# Patient Record
Sex: FEMALE | Race: BLACK OR AFRICAN AMERICAN | NOT HISPANIC OR LATINO | URBAN - METROPOLITAN AREA
[De-identification: names, ages, dates, MRNs, and addresses within clinical notes are randomized per-mention and may not be internally consistent; named-entity substitution may affect disease eponyms.]

---

## 2018-06-07 ENCOUNTER — OUTPATIENT (OUTPATIENT)
Dept: OUTPATIENT SERVICES | Facility: HOSPITAL | Age: 48
LOS: 1 days | End: 2018-06-07
Payer: MEDICAID

## 2018-06-07 ENCOUNTER — APPOINTMENT (OUTPATIENT)
Dept: MAMMOGRAPHY | Facility: IMAGING CENTER | Age: 48
End: 2018-06-07
Payer: COMMERCIAL

## 2018-06-07 ENCOUNTER — APPOINTMENT (OUTPATIENT)
Dept: ULTRASOUND IMAGING | Facility: IMAGING CENTER | Age: 48
End: 2018-06-07
Payer: COMMERCIAL

## 2018-06-07 DIAGNOSIS — Z00.00 ENCOUNTER FOR GENERAL ADULT MEDICAL EXAMINATION WITHOUT ABNORMAL FINDINGS: ICD-10-CM

## 2018-06-07 PROCEDURE — 77066 DX MAMMO INCL CAD BI: CPT

## 2018-06-07 PROCEDURE — 77066 DX MAMMO INCL CAD BI: CPT | Mod: 26

## 2018-06-07 PROCEDURE — G0279: CPT | Mod: 26

## 2018-06-07 PROCEDURE — 76641 ULTRASOUND BREAST COMPLETE: CPT

## 2018-06-07 PROCEDURE — G0279: CPT

## 2018-06-08 PROCEDURE — 76641 ULTRASOUND BREAST COMPLETE: CPT | Mod: 26,50

## 2018-10-04 ENCOUNTER — APPOINTMENT (OUTPATIENT)
Dept: SURGICAL ONCOLOGY | Facility: CLINIC | Age: 48
End: 2018-10-04
Payer: MEDICAID

## 2018-10-04 ENCOUNTER — RECORD ABSTRACTING (OUTPATIENT)
Age: 48
End: 2018-10-04

## 2018-10-04 VITALS
BODY MASS INDEX: 41.62 KG/M2 | OXYGEN SATURATION: 98 % | DIASTOLIC BLOOD PRESSURE: 93 MMHG | HEART RATE: 87 BPM | SYSTOLIC BLOOD PRESSURE: 142 MMHG | WEIGHT: 259 LBS | HEIGHT: 66 IN

## 2018-10-04 PROCEDURE — 99203 OFFICE O/P NEW LOW 30 MIN: CPT

## 2019-01-19 ENCOUNTER — EMERGENCY (EMERGENCY)
Facility: HOSPITAL | Age: 49
LOS: 0 days | Discharge: HOME | End: 2019-01-19
Attending: EMERGENCY MEDICINE
Payer: COMMERCIAL

## 2019-01-19 VITALS
HEIGHT: 66 IN | HEART RATE: 81 BPM | TEMPERATURE: 98 F | RESPIRATION RATE: 21 BRPM | OXYGEN SATURATION: 100 % | DIASTOLIC BLOOD PRESSURE: 86 MMHG | SYSTOLIC BLOOD PRESSURE: 158 MMHG | WEIGHT: 259.04 LBS

## 2019-01-19 VITALS
TEMPERATURE: 99 F | HEART RATE: 80 BPM | RESPIRATION RATE: 18 BRPM | OXYGEN SATURATION: 98 % | DIASTOLIC BLOOD PRESSURE: 88 MMHG | SYSTOLIC BLOOD PRESSURE: 144 MMHG

## 2019-01-19 DIAGNOSIS — K29.70 GASTRITIS, UNSPECIFIED, WITHOUT BLEEDING: ICD-10-CM

## 2019-01-19 DIAGNOSIS — R10.9 UNSPECIFIED ABDOMINAL PAIN: ICD-10-CM

## 2019-01-19 LAB
ALBUMIN SERPL ELPH-MCNC: 3.1 G/DL — LOW (ref 3.3–5)
ALP SERPL-CCNC: 113 U/L — SIGNIFICANT CHANGE UP (ref 40–120)
ALT FLD-CCNC: 47 U/L — SIGNIFICANT CHANGE UP (ref 12–78)
AMYLASE P1 CFR SERPL: 59 U/L — SIGNIFICANT CHANGE UP (ref 25–115)
ANION GAP SERPL CALC-SCNC: 7 MMOL/L — SIGNIFICANT CHANGE UP (ref 5–17)
AST SERPL-CCNC: 55 U/L — HIGH (ref 15–37)
BASOPHILS # BLD AUTO: 0.02 K/UL — SIGNIFICANT CHANGE UP (ref 0–0.2)
BASOPHILS NFR BLD AUTO: 0.3 % — SIGNIFICANT CHANGE UP (ref 0–2)
BILIRUB SERPL-MCNC: 0.2 MG/DL — SIGNIFICANT CHANGE UP (ref 0.2–1.2)
BUN SERPL-MCNC: 12 MG/DL — SIGNIFICANT CHANGE UP (ref 7–23)
CALCIUM SERPL-MCNC: 8.6 MG/DL — SIGNIFICANT CHANGE UP (ref 8.5–10.1)
CHLORIDE SERPL-SCNC: 107 MMOL/L — SIGNIFICANT CHANGE UP (ref 96–108)
CO2 SERPL-SCNC: 28 MMOL/L — SIGNIFICANT CHANGE UP (ref 22–31)
CREAT SERPL-MCNC: 0.77 MG/DL — SIGNIFICANT CHANGE UP (ref 0.5–1.3)
EOSINOPHIL # BLD AUTO: 0.22 K/UL — SIGNIFICANT CHANGE UP (ref 0–0.5)
EOSINOPHIL NFR BLD AUTO: 2.9 % — SIGNIFICANT CHANGE UP (ref 0–6)
GLUCOSE SERPL-MCNC: 121 MG/DL — HIGH (ref 70–99)
HCG SERPL-ACNC: 1 MIU/ML — SIGNIFICANT CHANGE UP
HCT VFR BLD CALC: 36 % — SIGNIFICANT CHANGE UP (ref 34.5–45)
HGB BLD-MCNC: 11.6 G/DL — SIGNIFICANT CHANGE UP (ref 11.5–15.5)
IMM GRANULOCYTES NFR BLD AUTO: 0.1 % — SIGNIFICANT CHANGE UP (ref 0–1.5)
LIDOCAIN IGE QN: 160 U/L — SIGNIFICANT CHANGE UP (ref 73–393)
LYMPHOCYTES # BLD AUTO: 2.51 K/UL — SIGNIFICANT CHANGE UP (ref 1–3.3)
LYMPHOCYTES # BLD AUTO: 32.9 % — SIGNIFICANT CHANGE UP (ref 13–44)
MCHC RBC-ENTMCNC: 27.6 PG — SIGNIFICANT CHANGE UP (ref 27–34)
MCHC RBC-ENTMCNC: 32.2 GM/DL — SIGNIFICANT CHANGE UP (ref 32–36)
MCV RBC AUTO: 85.5 FL — SIGNIFICANT CHANGE UP (ref 80–100)
MONOCYTES # BLD AUTO: 0.34 K/UL — SIGNIFICANT CHANGE UP (ref 0–0.9)
MONOCYTES NFR BLD AUTO: 4.5 % — SIGNIFICANT CHANGE UP (ref 2–14)
NEUTROPHILS # BLD AUTO: 4.53 K/UL — SIGNIFICANT CHANGE UP (ref 1.8–7.4)
NEUTROPHILS NFR BLD AUTO: 59.3 % — SIGNIFICANT CHANGE UP (ref 43–77)
NRBC # BLD: 0 /100 WBCS — SIGNIFICANT CHANGE UP (ref 0–0)
PLATELET # BLD AUTO: 139 K/UL — LOW (ref 150–400)
POTASSIUM SERPL-MCNC: 3.8 MMOL/L — SIGNIFICANT CHANGE UP (ref 3.5–5.3)
POTASSIUM SERPL-SCNC: 3.8 MMOL/L — SIGNIFICANT CHANGE UP (ref 3.5–5.3)
PROT SERPL-MCNC: 7.2 GM/DL — SIGNIFICANT CHANGE UP (ref 6–8.3)
RBC # BLD: 4.21 M/UL — SIGNIFICANT CHANGE UP (ref 3.8–5.2)
RBC # FLD: 14.6 % — HIGH (ref 10.3–14.5)
SODIUM SERPL-SCNC: 142 MMOL/L — SIGNIFICANT CHANGE UP (ref 135–145)
TROPONIN I SERPL-MCNC: <.015 NG/ML — SIGNIFICANT CHANGE UP (ref 0.01–0.04)
TROPONIN I SERPL-MCNC: <.015 NG/ML — SIGNIFICANT CHANGE UP (ref 0.01–0.04)
WBC # BLD: 7.63 K/UL — SIGNIFICANT CHANGE UP (ref 3.8–10.5)
WBC # FLD AUTO: 7.63 K/UL — SIGNIFICANT CHANGE UP (ref 3.8–10.5)

## 2019-01-19 PROCEDURE — 99285 EMERGENCY DEPT VISIT HI MDM: CPT

## 2019-01-19 PROCEDURE — 71045 X-RAY EXAM CHEST 1 VIEW: CPT | Mod: 26

## 2019-01-19 PROCEDURE — 74177 CT ABD & PELVIS W/CONTRAST: CPT | Mod: 26

## 2019-01-19 RX ORDER — SODIUM CHLORIDE 9 MG/ML
1000 INJECTION INTRAMUSCULAR; INTRAVENOUS; SUBCUTANEOUS ONCE
Qty: 0 | Refills: 0 | Status: COMPLETED | OUTPATIENT
Start: 2019-01-19 | End: 2019-01-19

## 2019-01-19 RX ORDER — PANTOPRAZOLE SODIUM 20 MG/1
40 TABLET, DELAYED RELEASE ORAL ONCE
Qty: 0 | Refills: 0 | Status: COMPLETED | OUTPATIENT
Start: 2019-01-19 | End: 2019-01-19

## 2019-01-19 RX ORDER — FAMOTIDINE 10 MG/ML
20 INJECTION INTRAVENOUS ONCE
Qty: 0 | Refills: 0 | Status: COMPLETED | OUTPATIENT
Start: 2019-01-19 | End: 2019-01-19

## 2019-01-19 RX ORDER — OMEPRAZOLE 10 MG/1
1 CAPSULE, DELAYED RELEASE ORAL
Qty: 30 | Refills: 0 | OUTPATIENT
Start: 2019-01-19 | End: 2019-02-17

## 2019-01-19 RX ADMIN — FAMOTIDINE 20 MILLIGRAM(S): 10 INJECTION INTRAVENOUS at 01:32

## 2019-01-19 RX ADMIN — SODIUM CHLORIDE 1000 MILLILITER(S): 9 INJECTION INTRAMUSCULAR; INTRAVENOUS; SUBCUTANEOUS at 01:32

## 2019-01-19 RX ADMIN — PANTOPRAZOLE SODIUM 40 MILLIGRAM(S): 20 TABLET, DELAYED RELEASE ORAL at 04:19

## 2019-01-19 RX ADMIN — Medication 30 MILLILITER(S): at 01:32

## 2019-01-19 RX ADMIN — SODIUM CHLORIDE 1000 MILLILITER(S): 9 INJECTION INTRAMUSCULAR; INTRAVENOUS; SUBCUTANEOUS at 04:19

## 2019-01-19 NOTE — ED PROVIDER NOTE - OBJECTIVE STATEMENT
Pertinent PMH/PSH/FHx/SHx and Review of Systems contained within:  Patient presents to the ED for abdominal pain.  Patient reports epigastric pain which started about 1 hour ago, with slight radiating towards umbilicus.  Denies radiation to the back, RUQ, chest.  Denies any shortness of breath, calf pain, leg swelling, or use of OCPs.  No associated fever, nausea, vomiting, or diarrhea.  Denies excessive caffeine intake or use of NSAIDs. LBM was earlier today and normal. Denies associated vaginal discharge, bleeding, or urinary symptoms.     Relevant PMHx/SHx/SOCHx/FAMH:  History of Csection  Patient denies EtOH/tobacco/illicit substance use.    ROS: No fever/chills, No headache/photophobia/eye pain/changes in vision, No ear pain/sore throat/dysphagia, No chest pain/palpitations, no SOB/cough/wheeze/stridor, No N/V/D/melena, no dysuria/frequency/discharge, No neck/back pain, no rash, no changes in neurological status/function.

## 2019-01-19 NOTE — ED ADULT NURSE NOTE - NSIMPLEMENTINTERV_GEN_ALL_ED
Implemented All Universal Safety Interventions:  [place] to call system. Call bell, personal items and telephone within reach. Instruct patient to call for assistance. Room bathroom lighting operational. Non-slip footwear when patient is off stretcher. Physically safe environment: no spills, clutter or unnecessary equipment. Stretcher in lowest position, wheels locked, appropriate side rails in place.

## 2019-01-19 NOTE — ED ADULT TRIAGE NOTE - CHIEF COMPLAINT QUOTE
Patient reports : "A terrible pain in my stomach." Pain started 1 hour ago. + nausea. denies vomiting or diarrhea. Patient reports : "A terrible pain in my stomach." Pain started 1 hour ago. + nausea. denies vomiting or diarrhea. patient with epigastric area pain.

## 2019-01-19 NOTE — ED PROVIDER NOTE - MEDICAL DECISION MAKING DETAILS
Patient presents for epigastric pain.  VSS.  Serial EKG and troponins negative.  Pain is much improved with PPIs.  Remaining lab values reviewed, there are no values which require acute intervention.  Will treat for gastritis, GI and cardiology referrals.  Discussed results and outcome of today's visit with the patient.  Patient advised to please follow up with another healthcare provider within the next 24 hours and return to the Emergency Department for worsening symptoms or any other concerns.  Patient advised that their doctor may call  to follow up on the specific results of the tests performed today in the emergency department.   Patient appears well on discharge.

## 2019-01-19 NOTE — ED ADULT NURSE NOTE - CHIEF COMPLAINT QUOTE
Patient reports : "A terrible pain in my stomach." Pain started 1 hour ago. + nausea. denies vomiting or diarrhea. patient with epigastric area pain.

## 2019-01-19 NOTE — ED PROVIDER NOTE - PHYSICAL EXAMINATION
Gen: Alert, mild distress, well appearing  Head: NC, AT, PERRL, EOMI, normal lids/conjunctiva  ENT: normal hearing, patent oropharynx without erythema/exudate, uvula midline  Neck: +supple, no tenderness/meningismus/JVD, +Trachea midline  Pulm: Bilateral BS, normal resp effort, no wheeze/stridor/retractions  CV: RRR, no M/R/G, +dist pulses  Abd: soft, +epigastric tenderness to palpation, ND, +BS, no palpable masses  Mskel: no edema/erythema/cyanosis  Skin: no rash, warm/dry  Neuro: AAOx3, no apparent sensory/motor deficits, coordination intact

## 2019-01-24 ENCOUNTER — EMERGENCY (EMERGENCY)
Facility: HOSPITAL | Age: 49
LOS: 0 days | Discharge: HOME | End: 2019-01-24
Attending: EMERGENCY MEDICINE
Payer: COMMERCIAL

## 2019-01-24 VITALS
OXYGEN SATURATION: 98 % | HEART RATE: 81 BPM | RESPIRATION RATE: 20 BRPM | TEMPERATURE: 99 F | DIASTOLIC BLOOD PRESSURE: 89 MMHG | SYSTOLIC BLOOD PRESSURE: 139 MMHG

## 2019-01-24 VITALS
RESPIRATION RATE: 20 BRPM | SYSTOLIC BLOOD PRESSURE: 133 MMHG | WEIGHT: 257.06 LBS | HEIGHT: 66 IN | DIASTOLIC BLOOD PRESSURE: 94 MMHG | TEMPERATURE: 99 F | OXYGEN SATURATION: 99 % | HEART RATE: 104 BPM

## 2019-01-24 PROCEDURE — 99284 EMERGENCY DEPT VISIT MOD MDM: CPT

## 2019-01-24 RX ORDER — FAMOTIDINE 10 MG/ML
1 INJECTION INTRAVENOUS
Qty: 5 | Refills: 0 | OUTPATIENT
Start: 2019-01-24 | End: 2019-01-28

## 2019-01-24 RX ORDER — SODIUM CHLORIDE 9 MG/ML
1000 INJECTION INTRAMUSCULAR; INTRAVENOUS; SUBCUTANEOUS
Qty: 0 | Refills: 0 | Status: COMPLETED | OUTPATIENT
Start: 2019-01-24 | End: 2019-01-24

## 2019-01-24 RX ORDER — EPINEPHRINE 0.3 MG/.3ML
1 INJECTION INTRAMUSCULAR; SUBCUTANEOUS
Qty: 2 | Refills: 0 | OUTPATIENT
Start: 2019-01-24 | End: 2019-01-24

## 2019-01-24 RX ORDER — DIPHENHYDRAMINE HCL 50 MG
50 CAPSULE ORAL ONCE
Qty: 0 | Refills: 0 | Status: COMPLETED | OUTPATIENT
Start: 2019-01-24 | End: 2019-01-24

## 2019-01-24 RX ORDER — DIPHENHYDRAMINE HCL 50 MG
1 CAPSULE ORAL
Qty: 20 | Refills: 0 | OUTPATIENT
Start: 2019-01-24

## 2019-01-24 RX ORDER — FAMOTIDINE 10 MG/ML
20 INJECTION INTRAVENOUS ONCE
Qty: 0 | Refills: 0 | Status: COMPLETED | OUTPATIENT
Start: 2019-01-24 | End: 2019-01-24

## 2019-01-24 RX ADMIN — SODIUM CHLORIDE 2000 MILLILITER(S): 9 INJECTION INTRAMUSCULAR; INTRAVENOUS; SUBCUTANEOUS at 21:49

## 2019-01-24 RX ADMIN — FAMOTIDINE 20 MILLIGRAM(S): 10 INJECTION INTRAVENOUS at 18:51

## 2019-01-24 RX ADMIN — Medication 125 MILLIGRAM(S): at 18:51

## 2019-01-24 RX ADMIN — SODIUM CHLORIDE 1000 MILLILITER(S): 9 INJECTION INTRAMUSCULAR; INTRAVENOUS; SUBCUTANEOUS at 21:15

## 2019-01-24 RX ADMIN — SODIUM CHLORIDE 2000 MILLILITER(S): 9 INJECTION INTRAMUSCULAR; INTRAVENOUS; SUBCUTANEOUS at 20:45

## 2019-01-24 RX ADMIN — Medication 50 MILLIGRAM(S): at 18:51

## 2019-01-24 NOTE — ED ADULT NURSE NOTE - OBJECTIVE STATEMENT
Pt c/o swelling to face to redness and itching all over body this morning. pt denies difficulty swallowing, no apparent respiratory distress noted upon arrival to ED

## 2019-01-24 NOTE — ED ADULT TRIAGE NOTE - CHIEF COMPLAINT QUOTE
patient c/o of swelling face and lips started this morning , patient denied difficulty breathing , denied difficulty swallowing , c/o of rash all over the body , patient took Benadryl around 11 am

## 2019-01-24 NOTE — ED PROVIDER NOTE - CARE PROVIDER_API CALL
Rufino Amaya), Allergy and Immunology; Pediatrics  [address] [address]  Phone: [phone]  Fax: [phone]

## 2019-01-24 NOTE — ED PROVIDER NOTE - MEDICAL DECISION MAKING DETAILS
patient improved, hives resolved, no respiratory distress, desires discharge. educated on not being exposed to allergens and to have epi pen at all times in case of anaphylaxis. given referral to allergist. agrees to follow up and understands education given.

## 2019-01-24 NOTE — ED PROVIDER NOTE - OBJECTIVE STATEMENT
48 years old female walked in c/o itching rash to face, neck, back chest and arms since this morning. Pt sts she ate salmon last night. Pt denies headache, dizziness, difficulty of swallowing, sob, cough, chest pain, nausea, vomiting, fever, chills, abd pain, Pt sts she is not at risk of being pregnant.

## 2019-01-24 NOTE — ED PROVIDER NOTE - CONSTITUTIONAL, MLM
normal... Well appearing, well nourished, awake, alert, oriented to person, place, time/situation and in no apparent distress. Speaking in clear full sentences no drooling no nasal flaring no shoulders retractions, smiling appears very comfortable

## 2019-01-24 NOTE — ED PROVIDER NOTE - NSFOLLOWUPINSTRUCTIONS_ED_ALL_ED_FT
You are advised to please follow up with your primary care doctor within the next 24 hours and return to the Emergency Department for worsening symptoms or any other concerns.  Your doctor may call  to follow up on the specific results of the tests performed today in the emergency department.

## 2019-01-26 DIAGNOSIS — R21 RASH AND OTHER NONSPECIFIC SKIN ERUPTION: ICD-10-CM

## 2019-01-26 DIAGNOSIS — T78.40XA ALLERGY, UNSPECIFIED, INITIAL ENCOUNTER: ICD-10-CM

## 2019-01-26 DIAGNOSIS — T78.49XA OTHER ALLERGY, INITIAL ENCOUNTER: ICD-10-CM
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
## 2019-01-30 ENCOUNTER — APPOINTMENT (OUTPATIENT)
Dept: MAMMOGRAPHY | Facility: IMAGING CENTER | Age: 49
End: 2019-01-30
Payer: MEDICAID

## 2019-01-30 ENCOUNTER — APPOINTMENT (OUTPATIENT)
Dept: ULTRASOUND IMAGING | Facility: IMAGING CENTER | Age: 49
End: 2019-01-30
Payer: MEDICAID

## 2019-01-30 ENCOUNTER — OUTPATIENT (OUTPATIENT)
Dept: OUTPATIENT SERVICES | Facility: HOSPITAL | Age: 49
LOS: 1 days | End: 2019-01-30
Payer: MEDICAID

## 2019-01-30 DIAGNOSIS — Z00.8 ENCOUNTER FOR OTHER GENERAL EXAMINATION: ICD-10-CM

## 2019-01-30 PROCEDURE — 77066 DX MAMMO INCL CAD BI: CPT

## 2019-01-30 PROCEDURE — 76642 ULTRASOUND BREAST LIMITED: CPT | Mod: 26,50

## 2019-01-30 PROCEDURE — G0279: CPT | Mod: 26

## 2019-01-30 PROCEDURE — G0279: CPT

## 2019-01-30 PROCEDURE — 76642 ULTRASOUND BREAST LIMITED: CPT

## 2019-01-30 PROCEDURE — 77066 DX MAMMO INCL CAD BI: CPT | Mod: 26

## 2019-07-15 ENCOUNTER — OUTPATIENT (OUTPATIENT)
Dept: OUTPATIENT SERVICES | Facility: HOSPITAL | Age: 49
LOS: 1 days | End: 2019-07-15
Payer: COMMERCIAL

## 2019-07-15 ENCOUNTER — APPOINTMENT (OUTPATIENT)
Dept: ULTRASOUND IMAGING | Facility: IMAGING CENTER | Age: 49
End: 2019-07-15
Payer: COMMERCIAL

## 2019-07-15 ENCOUNTER — APPOINTMENT (OUTPATIENT)
Dept: MAMMOGRAPHY | Facility: IMAGING CENTER | Age: 49
End: 2019-07-15
Payer: COMMERCIAL

## 2019-07-15 DIAGNOSIS — Z00.8 ENCOUNTER FOR OTHER GENERAL EXAMINATION: ICD-10-CM

## 2019-07-15 PROCEDURE — 76642 ULTRASOUND BREAST LIMITED: CPT

## 2019-07-15 PROCEDURE — 77065 DX MAMMO INCL CAD UNI: CPT

## 2019-07-15 PROCEDURE — 76642 ULTRASOUND BREAST LIMITED: CPT | Mod: 26,LT

## 2019-07-15 PROCEDURE — G0279: CPT | Mod: 26

## 2019-07-15 PROCEDURE — 77065 DX MAMMO INCL CAD UNI: CPT | Mod: 26,LT

## 2019-07-15 PROCEDURE — G0279: CPT

## 2019-07-20 ENCOUNTER — OUTPATIENT (OUTPATIENT)
Dept: OUTPATIENT SERVICES | Facility: HOSPITAL | Age: 49
LOS: 1 days | End: 2019-07-20
Payer: COMMERCIAL

## 2019-07-20 ENCOUNTER — APPOINTMENT (OUTPATIENT)
Dept: ULTRASOUND IMAGING | Facility: IMAGING CENTER | Age: 49
End: 2019-07-20
Payer: COMMERCIAL

## 2019-07-20 DIAGNOSIS — Z00.8 ENCOUNTER FOR OTHER GENERAL EXAMINATION: ICD-10-CM

## 2019-07-20 PROCEDURE — 76642 ULTRASOUND BREAST LIMITED: CPT

## 2019-07-20 PROCEDURE — 76642 ULTRASOUND BREAST LIMITED: CPT | Mod: 26,50

## 2019-08-03 ENCOUNTER — RESULT REVIEW (OUTPATIENT)
Age: 49
End: 2019-08-03

## 2019-09-14 ENCOUNTER — RESULT REVIEW (OUTPATIENT)
Age: 49
End: 2019-09-14

## 2019-12-15 NOTE — ED ADULT NURSE NOTE - NSFALLRSKASSESASSIST_ED_ALL_ED
MD notified of elevated BP.  Awaiting new medication orders.  Will continue to closely monitor.     no

## 2020-04-26 ENCOUNTER — MESSAGE (OUTPATIENT)
Age: 50
End: 2020-04-26

## 2020-05-06 ENCOUNTER — APPOINTMENT (OUTPATIENT)
Dept: DISASTER EMERGENCY | Facility: CLINIC | Age: 50
End: 2020-05-06

## 2020-05-07 LAB
SARS-COV-2 IGG SERPL IA-ACNC: 0.2 INDEX
SARS-COV-2 IGG SERPL QL IA: NEGATIVE

## 2020-05-19 ENCOUNTER — APPOINTMENT (OUTPATIENT)
Dept: OTOLARYNGOLOGY | Facility: CLINIC | Age: 50
End: 2020-05-19

## 2020-05-29 ENCOUNTER — OUTPATIENT (OUTPATIENT)
Dept: OUTPATIENT SERVICES | Facility: HOSPITAL | Age: 50
LOS: 1 days | End: 2020-05-29
Payer: COMMERCIAL

## 2020-05-29 DIAGNOSIS — Z00.8 ENCOUNTER FOR OTHER GENERAL EXAMINATION: ICD-10-CM

## 2020-05-29 PROCEDURE — 77063 BREAST TOMOSYNTHESIS BI: CPT

## 2020-05-29 PROCEDURE — 76641 ULTRASOUND BREAST COMPLETE: CPT | Mod: 26,50

## 2020-05-29 PROCEDURE — 77067 SCR MAMMO BI INCL CAD: CPT | Mod: 26

## 2020-05-29 PROCEDURE — 77067 SCR MAMMO BI INCL CAD: CPT

## 2020-05-29 PROCEDURE — 76641 ULTRASOUND BREAST COMPLETE: CPT

## 2020-05-29 PROCEDURE — 77063 BREAST TOMOSYNTHESIS BI: CPT | Mod: 26

## 2020-08-04 ENCOUNTER — APPOINTMENT (OUTPATIENT)
Dept: GASTROENTEROLOGY | Facility: CLINIC | Age: 50
End: 2020-08-04

## 2020-11-05 ENCOUNTER — APPOINTMENT (OUTPATIENT)
Dept: GASTROENTEROLOGY | Facility: CLINIC | Age: 50
End: 2020-11-05
Payer: COMMERCIAL

## 2020-11-05 VITALS
WEIGHT: 262.9 LBS | DIASTOLIC BLOOD PRESSURE: 84 MMHG | TEMPERATURE: 97.9 F | BODY MASS INDEX: 42.25 KG/M2 | HEIGHT: 66 IN | SYSTOLIC BLOOD PRESSURE: 130 MMHG | HEART RATE: 98 BPM

## 2020-11-05 DIAGNOSIS — G43.909 MIGRAINE, UNSPECIFIED, NOT INTRACTABLE, W/OUT STATUS MIGRAINOSUS: ICD-10-CM

## 2020-11-05 DIAGNOSIS — Z12.11 ENCOUNTER FOR SCREENING FOR MALIGNANT NEOPLASM OF COLON: ICD-10-CM

## 2020-11-05 PROCEDURE — 99204 OFFICE O/P NEW MOD 45 MIN: CPT

## 2020-11-05 PROCEDURE — 99072 ADDL SUPL MATRL&STAF TM PHE: CPT

## 2020-11-05 NOTE — REVIEW OF SYSTEMS
[Recent Weight Loss (___ Lbs)] : recent [unfilled] ~Ulb weight loss [Heartburn] : heartburn [As Noted in HPI] : as noted in HPI [Negative] : Heme/Lymph

## 2020-11-05 NOTE — HISTORY OF PRESENT ILLNESS
[Heartburn] : stable heartburn [Nausea] : denies nausea [Vomiting] : denies vomiting [Diarrhea] : denies diarrhea [Constipation] : denies constipation [Yellow Skin Or Eyes (Jaundice)] : denies jaundice [Abdominal Pain] : denies abdominal pain [Abdominal Swelling] : denies abdominal swelling [Rectal Pain] : denies rectal pain [de-identified] : Kathrine presents to the office today for evaluation for colon cancer screening.  \par \par She reports heartburn when she eats greasy foods.  She otherwise feels well from a GI perspective, and denies any dysphagia, nausea, abdominal pain, change in bowel habits, weight loss, or family history of colon cancer.  She normally moves her bowels once a day, but can see scant rectal bleeding with constipation.  She has never had a colonoscopy before.

## 2020-11-05 NOTE — ASSESSMENT
[FreeTextEntry1] : 1.  Encounter for colon cancer screening in average risk patient.  No previous colonoscopy.\par 2.  Heartburn with fatty foods, stable with diet control.\par 3.  Obesity.\par \par Recs:\par - Recent labs reviewed.\par - Patient was counseled on GERD lifestyle modifications including head of bed elevation at night, avoiding lying down 2-3 hours after eating, weight loss, avoiding tight-fitting clothing, eating small, frequent meals, and minimizing potential food triggers (caffeine, spicy foods, citrus foods, chocolate, mint, alcohol).\par - Patient was advised to undergo colonoscopy- procedure, risks, benefits, and alternatives were explained. Patient agreeable. Brochure given. Suprep.

## 2020-11-05 NOTE — CONSULT LETTER
[Dear  ___] : Dear  [unfilled], [Consult Letter:] : I had the pleasure of evaluating your patient, [unfilled]. [Please see my note below.] : Please see my note below. [Consult Closing:] : Thank you very much for allowing me to participate in the care of this patient.  If you have any questions, please do not hesitate to contact me. [Sincerely,] : Sincerely, [FreeTextEntry3] : Oswaldo Mcfarlane MD\par Department of Gastroenterology\par [facility]\par [address] [place] [address], [facility] [address]\[place] [address]\par Tel: [phone]\par Email: [email or web address]

## 2020-12-04 ENCOUNTER — TRANSCRIPTION ENCOUNTER (OUTPATIENT)
Age: 50
End: 2020-12-04

## 2020-12-04 ENCOUNTER — APPOINTMENT (OUTPATIENT)
Dept: DISASTER EMERGENCY | Facility: CLINIC | Age: 50
End: 2020-12-04

## 2020-12-05 LAB — SARS-COV-2 N GENE NPH QL NAA+PROBE: NOT DETECTED

## 2020-12-07 ENCOUNTER — APPOINTMENT (OUTPATIENT)
Dept: GASTROENTEROLOGY | Facility: HOSPITAL | Age: 50
End: 2020-12-07

## 2020-12-07 ENCOUNTER — OUTPATIENT (OUTPATIENT)
Dept: OUTPATIENT SERVICES | Facility: HOSPITAL | Age: 50
LOS: 1 days | Discharge: HOME | End: 2020-12-07
Payer: COMMERCIAL

## 2020-12-07 VITALS
DIASTOLIC BLOOD PRESSURE: 81 MMHG | OXYGEN SATURATION: 99 % | SYSTOLIC BLOOD PRESSURE: 118 MMHG | HEART RATE: 68 BPM | RESPIRATION RATE: 19 BRPM

## 2020-12-07 VITALS
TEMPERATURE: 98 F | SYSTOLIC BLOOD PRESSURE: 119 MMHG | RESPIRATION RATE: 20 BRPM | WEIGHT: 261.91 LBS | DIASTOLIC BLOOD PRESSURE: 90 MMHG | OXYGEN SATURATION: 96 % | HEART RATE: 80 BPM | HEIGHT: 66 IN

## 2020-12-07 DIAGNOSIS — Z12.11 ENCOUNTER FOR SCREENING FOR MALIGNANT NEOPLASM OF COLON: ICD-10-CM

## 2020-12-07 PROCEDURE — 45378 DIAGNOSTIC COLONOSCOPY: CPT | Mod: 33

## 2020-12-23 PROBLEM — Z12.11 ENCOUNTER FOR SCREENING FOR MALIGNANT NEOPLASM OF COLON: Status: RESOLVED | Noted: 2020-11-05 | Resolved: 2020-12-23

## 2021-01-13 DIAGNOSIS — Z00.00 ENCOUNTER FOR GENERAL ADULT MEDICAL EXAMINATION W/OUT ABNORMAL FINDINGS: ICD-10-CM

## 2021-01-15 ENCOUNTER — APPOINTMENT (OUTPATIENT)
Dept: SURGERY | Facility: CLINIC | Age: 51
End: 2021-01-15
Payer: COMMERCIAL

## 2021-01-15 VITALS
DIASTOLIC BLOOD PRESSURE: 95 MMHG | SYSTOLIC BLOOD PRESSURE: 144 MMHG | RESPIRATION RATE: 16 BRPM | HEART RATE: 76 BPM | OXYGEN SATURATION: 98 %

## 2021-01-15 VITALS — WEIGHT: 256 LBS | BODY MASS INDEX: 42.65 KG/M2 | HEIGHT: 65 IN

## 2021-01-15 DIAGNOSIS — R73.03 PREDIABETES.: ICD-10-CM

## 2021-01-15 DIAGNOSIS — Z87.39 PERSONAL HISTORY OF OTHER DISEASES OF THE MUSCULOSKELETAL SYSTEM AND CONNECTIVE TISSUE: ICD-10-CM

## 2021-01-15 DIAGNOSIS — M25.562 PAIN IN RIGHT KNEE: ICD-10-CM

## 2021-01-15 DIAGNOSIS — M25.561 PAIN IN RIGHT KNEE: ICD-10-CM

## 2021-01-15 DIAGNOSIS — G89.29 PAIN IN RIGHT KNEE: ICD-10-CM

## 2021-01-15 LAB
APTT BLD: 30.3 SEC
BASOPHILS # BLD AUTO: 0.02 K/UL
BASOPHILS NFR BLD AUTO: 0.4 %
EOSINOPHIL # BLD AUTO: 0.17 K/UL
EOSINOPHIL NFR BLD AUTO: 3.4 %
HCT VFR BLD CALC: 39 %
HGB BLD-MCNC: 12.4 G/DL
IMM GRANULOCYTES NFR BLD AUTO: 0.2 %
INR PPP: 1.08 RATIO
LYMPHOCYTES # BLD AUTO: 2.66 K/UL
LYMPHOCYTES NFR BLD AUTO: 53.1 %
MAN DIFF?: NORMAL
MCHC RBC-ENTMCNC: 28.2 PG
MCHC RBC-ENTMCNC: 31.8 GM/DL
MCV RBC AUTO: 88.6 FL
MONOCYTES # BLD AUTO: 0.24 K/UL
MONOCYTES NFR BLD AUTO: 4.8 %
NEUTROPHILS # BLD AUTO: 1.91 K/UL
NEUTROPHILS NFR BLD AUTO: 38.1 %
PLATELET # BLD AUTO: 310 K/UL
PT BLD: 12.7 SEC
RBC # BLD: 4.4 M/UL
RBC # FLD: 13.6 %
WBC # FLD AUTO: 5.01 K/UL

## 2021-01-15 PROCEDURE — 99072 ADDL SUPL MATRL&STAF TM PHE: CPT

## 2021-01-15 PROCEDURE — 99205 OFFICE O/P NEW HI 60 MIN: CPT

## 2021-01-15 RX ORDER — SODIUM SULFATE, POTASSIUM SULFATE, MAGNESIUM SULFATE 17.5; 3.13; 1.6 G/ML; G/ML; G/ML
17.5-3.13-1.6 SOLUTION, CONCENTRATE ORAL
Qty: 1 | Refills: 0 | Status: DISCONTINUED | COMMUNITY
Start: 2020-11-05 | End: 2021-01-15

## 2021-01-15 NOTE — HISTORY OF PRESENT ILLNESS
[de-identified] : Kathrine is a 49 y/o female here for an evaluation for weight loss surgery.  She is 5 foot 5 256 pounds with a BMI of 42 she has been heavy most of her adult life she has been on multiple diet programs in the past losing up to 20 pounds at any one time always gaining that weight back she is looking for a more permanent solution to her weight loss problem she is interested in a sleeve gastrectomy

## 2021-01-15 NOTE — ASSESSMENT
[FreeTextEntry1] : 50-year-old female 5 foot 5 256 pounds with a BMI of 42 would like to be considered for a sleeve gastrectomy I believe she would be an excellent candidate she will undergo her usual medical nutritional and psychological work-up attend a preoperative support group meeting and return for second office visit once all these are completed the risks benefits expectations as well as options were discussed at length with the patient all of her questions were answered

## 2021-01-20 DIAGNOSIS — E55.9 VITAMIN D DEFICIENCY, UNSPECIFIED: ICD-10-CM

## 2021-01-20 LAB
25(OH)D3 SERPL-MCNC: 16.1 NG/ML
ALBUMIN SERPL ELPH-MCNC: 4.2 G/DL
ALP BLD-CCNC: 114 U/L
ALT SERPL-CCNC: 18 U/L
ANION GAP SERPL CALC-SCNC: 14 MMOL/L
AST SERPL-CCNC: 17 U/L
BILIRUB SERPL-MCNC: 0.2 MG/DL
BUN SERPL-MCNC: 10 MG/DL
CALCIUM SERPL-MCNC: 9.3 MG/DL
CHLORIDE SERPL-SCNC: 103 MMOL/L
CO2 SERPL-SCNC: 24 MMOL/L
CREAT SERPL-MCNC: 0.81 MG/DL
ESTIMATED AVERAGE GLUCOSE: 123 MG/DL
FOLATE SERPL-MCNC: 10.7 NG/ML
GLUCOSE SERPL-MCNC: 110 MG/DL
HBA1C MFR BLD HPLC: 5.9 %
HCG SERPL QL: NEGATIVE
IRON SERPL-MCNC: 63 UG/DL
PAPP-A SERPL-ACNC: 1 MIU/ML
POTASSIUM SERPL-SCNC: 4.3 MMOL/L
PROT SERPL-MCNC: 7 G/DL
SODIUM SERPL-SCNC: 141 MMOL/L
TSH SERPL-ACNC: 0.9 UIU/ML
VIT B12 SERPL-MCNC: 329 PG/ML

## 2021-01-22 LAB — VIT B1 SERPL-MCNC: 111.9 NMOL/L

## 2021-01-26 RX ORDER — CHLORHEXIDINE GLUCONATE 4 %
1000 LIQUID (ML) TOPICAL DAILY
Qty: 90 | Refills: 1 | Status: ACTIVE | COMMUNITY
Start: 2021-01-26 | End: 1900-01-01

## 2021-01-26 RX ORDER — ADHESIVE TAPE 3"X 2.3 YD
50 MCG TAPE, NON-MEDICATED TOPICAL
Qty: 100 | Refills: 1 | Status: ACTIVE | COMMUNITY
Start: 2021-01-26 | End: 1900-01-01

## 2021-01-29 ENCOUNTER — OUTPATIENT (OUTPATIENT)
Dept: OUTPATIENT SERVICES | Facility: HOSPITAL | Age: 51
LOS: 1 days | End: 2021-01-29
Payer: COMMERCIAL

## 2021-01-29 ENCOUNTER — APPOINTMENT (OUTPATIENT)
Dept: ULTRASOUND IMAGING | Facility: IMAGING CENTER | Age: 51
End: 2021-01-29
Payer: COMMERCIAL

## 2021-01-29 DIAGNOSIS — Z00.00 ENCOUNTER FOR GENERAL ADULT MEDICAL EXAMINATION WITHOUT ABNORMAL FINDINGS: ICD-10-CM

## 2021-01-29 DIAGNOSIS — E06.1 SUBACUTE THYROIDITIS: ICD-10-CM

## 2021-01-29 DIAGNOSIS — Z01.818 ENCOUNTER FOR OTHER PREPROCEDURAL EXAMINATION: ICD-10-CM

## 2021-01-29 DIAGNOSIS — R12 HEARTBURN: ICD-10-CM

## 2021-01-29 PROCEDURE — 76700 US EXAM ABDOM COMPLETE: CPT | Mod: 26

## 2021-01-29 PROCEDURE — 76700 US EXAM ABDOM COMPLETE: CPT

## 2021-02-01 DIAGNOSIS — K76.0 FATTY (CHANGE OF) LIVER, NOT ELSEWHERE CLASSIFIED: ICD-10-CM

## 2021-02-16 ENCOUNTER — APPOINTMENT (OUTPATIENT)
Dept: GASTROENTEROLOGY | Facility: CLINIC | Age: 51
End: 2021-02-16
Payer: COMMERCIAL

## 2021-02-16 VITALS
WEIGHT: 263 LBS | DIASTOLIC BLOOD PRESSURE: 91 MMHG | SYSTOLIC BLOOD PRESSURE: 138 MMHG | HEIGHT: 65 IN | BODY MASS INDEX: 43.82 KG/M2 | HEART RATE: 81 BPM | TEMPERATURE: 96.8 F

## 2021-02-16 DIAGNOSIS — E53.8 DEFICIENCY OF OTHER SPECIFIED B GROUP VITAMINS: ICD-10-CM

## 2021-02-16 PROCEDURE — 99072 ADDL SUPL MATRL&STAF TM PHE: CPT

## 2021-02-16 PROCEDURE — 99214 OFFICE O/P EST MOD 30 MIN: CPT

## 2021-02-16 NOTE — ASSESSMENT
[FreeTextEntry1] : 1.  Pre-operative evaluation for bariatric surgery for morbid obesity.\par 2.  Heartburn with fatty foods, stable with diet control.\par 3.  Internal hemorrhoids on colonoscopy in December 2020.\par 4.  Hepatic steatosis.\par \par Recs:\par - Recent labs reviewed.\par - Colonoscopy results reviewed.  Repeat colonoscopy in 5-10 years.\par - Patient was advised to undergo endoscopy- procedure, risks, benefits, and alternatives were explained. Patient agreeable. Brochure given.

## 2021-02-16 NOTE — HISTORY OF PRESENT ILLNESS
[Heartburn] : stable heartburn [Nausea] : denies nausea [Vomiting] : denies vomiting [Constipation] : denies constipation [Diarrhea] : denies diarrhea [Yellow Skin Or Eyes (Jaundice)] : denies jaundice [Abdominal Pain] : denies abdominal pain [Abdominal Swelling] : denies abdominal swelling [Rectal Pain] : denies rectal pain [_________] : Performed [unfilled] [de-identified] : Kathrine presents to the office today for follow up.  She is contemplating a sleeve gastrectomy for morbid obesity.\par \par The patient was last seen in December 2020 when she underwent a screening colonoscopy which only showed hemorrhoids.  Since the procedure she has been feeling well.  She experiences mild bloating and has previously reported heartburn when she eats greasy foods.\par \par She saw Dr. Melchor one month ago to undergo a sleeve gastrectomy for morbid obesity.  Her current BMI is 43 and she has struggled with weight most of her adult life. [de-identified] : hemorrhoids

## 2021-02-19 ENCOUNTER — APPOINTMENT (OUTPATIENT)
Dept: CARDIOLOGY | Facility: CLINIC | Age: 51
End: 2021-02-19
Payer: COMMERCIAL

## 2021-02-19 ENCOUNTER — NON-APPOINTMENT (OUTPATIENT)
Age: 51
End: 2021-02-19

## 2021-02-19 VITALS — OXYGEN SATURATION: 99 % | HEART RATE: 93 BPM | SYSTOLIC BLOOD PRESSURE: 133 MMHG | DIASTOLIC BLOOD PRESSURE: 86 MMHG

## 2021-02-19 DIAGNOSIS — I25.10 ATHEROSCLEROTIC HEART DISEASE OF NATIVE CORONARY ARTERY W/OUT ANGINA PECTORIS: ICD-10-CM

## 2021-02-19 PROCEDURE — 99072 ADDL SUPL MATRL&STAF TM PHE: CPT

## 2021-02-19 PROCEDURE — 93000 ELECTROCARDIOGRAM COMPLETE: CPT

## 2021-02-19 PROCEDURE — 99204 OFFICE O/P NEW MOD 45 MIN: CPT

## 2021-02-19 NOTE — PHYSICAL EXAM
[General Appearance - Well Developed] : well developed [Normal Appearance] : normal appearance [Well Groomed] : well groomed [General Appearance - Well Nourished] : well nourished [No Deformities] : no deformities [General Appearance - In No Acute Distress] : no acute distress [Normal Conjunctiva] : the conjunctiva exhibited no abnormalities [Eyelids - No Xanthelasma] : the eyelids demonstrated no xanthelasmas [Normal Oral Mucosa] : normal oral mucosa [No Oral Pallor] : no oral pallor [No Oral Cyanosis] : no oral cyanosis [Normal Jugular Venous A Waves Present] : normal jugular venous A waves present [Normal Jugular Venous V Waves Present] : normal jugular venous V waves present [No Jugular Venous Colón A Waves] : no jugular venous colón A waves [Heart Sounds] : normal S1 and S2 [Heart Rate And Rhythm] : heart rate and rhythm were normal [Murmurs] : no murmurs present [Respiration, Rhythm And Depth] : normal respiratory rhythm and effort [Exaggerated Use Of Accessory Muscles For Inspiration] : no accessory muscle use [Auscultation Breath Sounds / Voice Sounds] : lungs were clear to auscultation bilaterally [Abdomen Tenderness] : non-tender [Abdomen Soft] : soft [Abdomen Mass (___ Cm)] : no abdominal mass palpated [Abnormal Walk] : normal gait [Gait - Sufficient For Exercise Testing] : the gait was sufficient for exercise testing [Nail Clubbing] : no clubbing of the fingernails [Cyanosis, Localized] : no localized cyanosis [Petechial Hemorrhages (___cm)] : no petechial hemorrhages [Skin Color & Pigmentation] : normal skin color and pigmentation [] : no rash [No Venous Stasis] : no venous stasis [Skin Lesions] : no skin lesions [No Skin Ulcers] : no skin ulcer [No Xanthoma] : no  xanthoma was observed [Oriented To Time, Place, And Person] : oriented to person, place, and time [Affect] : the affect was normal [Mood] : the mood was normal [No Anxiety] : not feeling anxious

## 2021-02-19 NOTE — REASON FOR VISIT
[Initial Evaluation] : an initial evaluation of [FreeTextEntry1] : 50F\par here for cardiac evaluation for bariatric surgery\par needs echo and stress test\par no chest pain or shortness of breath no palpitations\par \par No personal history of heart disease\par she is on no medications\par \par BP is well controlled\par ECG with non-specific t waves. \par no leg swelling\par \par \par No family history of heart disease\par Never smoker\par no ETOH\par \par Pregnancy 21 years ago, daughter, healthy.  \par \par Works at [employer] . \par \par \par

## 2021-02-19 NOTE — ASSESSMENT
[FreeTextEntry1] : Assessment:\par 1.  morbid obesity \par 2.  Preoperative evaluation prior to bariatric surgery\par \par \par Plan\par 1.  Echo\par 2.  Exercise Nuclear stress test\par 3.  Coronary calcium score\par - await testing.\par 4.  healthy diet and exercise\par \par Aida

## 2021-02-26 ENCOUNTER — APPOINTMENT (OUTPATIENT)
Dept: DISASTER EMERGENCY | Facility: CLINIC | Age: 51
End: 2021-02-26

## 2021-02-26 ENCOUNTER — RESULT REVIEW (OUTPATIENT)
Age: 51
End: 2021-02-26

## 2021-02-26 ENCOUNTER — OUTPATIENT (OUTPATIENT)
Dept: OUTPATIENT SERVICES | Facility: HOSPITAL | Age: 51
LOS: 1 days | End: 2021-02-26
Payer: COMMERCIAL

## 2021-02-26 ENCOUNTER — APPOINTMENT (OUTPATIENT)
Dept: CT IMAGING | Facility: CLINIC | Age: 51
End: 2021-02-26
Payer: SELF-PAY

## 2021-02-26 DIAGNOSIS — Z01.818 ENCOUNTER FOR OTHER PREPROCEDURAL EXAMINATION: ICD-10-CM

## 2021-02-26 PROCEDURE — 75571 CT HRT W/O DYE W/CA TEST: CPT | Mod: 26

## 2021-02-26 PROCEDURE — 75571 CT HRT W/O DYE W/CA TEST: CPT

## 2021-02-27 LAB — SARS-COV-2 N GENE NPH QL NAA+PROBE: NOT DETECTED

## 2021-03-01 ENCOUNTER — RESULT REVIEW (OUTPATIENT)
Age: 51
End: 2021-03-01

## 2021-03-01 ENCOUNTER — APPOINTMENT (OUTPATIENT)
Dept: GASTROENTEROLOGY | Facility: HOSPITAL | Age: 51
End: 2021-03-01

## 2021-03-01 ENCOUNTER — OUTPATIENT (OUTPATIENT)
Dept: OUTPATIENT SERVICES | Facility: HOSPITAL | Age: 51
LOS: 1 days | Discharge: HOME | End: 2021-03-01
Payer: COMMERCIAL

## 2021-03-01 VITALS
WEIGHT: 261.91 LBS | HEIGHT: 65 IN | HEART RATE: 72 BPM | RESPIRATION RATE: 22 BRPM | DIASTOLIC BLOOD PRESSURE: 93 MMHG | OXYGEN SATURATION: 97 % | TEMPERATURE: 97 F | SYSTOLIC BLOOD PRESSURE: 125 MMHG

## 2021-03-01 VITALS
SYSTOLIC BLOOD PRESSURE: 121 MMHG | DIASTOLIC BLOOD PRESSURE: 79 MMHG | HEART RATE: 64 BPM | RESPIRATION RATE: 22 BRPM | OXYGEN SATURATION: 98 %

## 2021-03-01 DIAGNOSIS — Z98.84 BARIATRIC SURGERY STATUS: ICD-10-CM

## 2021-03-01 DIAGNOSIS — I25.84 ATHEROSCLEROTIC HEART DISEASE OF NATIVE CORONARY ARTERY W/OUT ANGINA PECTORIS: ICD-10-CM

## 2021-03-01 DIAGNOSIS — Z98.891 HISTORY OF UTERINE SCAR FROM PREVIOUS SURGERY: Chronic | ICD-10-CM

## 2021-03-01 DIAGNOSIS — I25.10 ATHEROSCLEROTIC HEART DISEASE OF NATIVE CORONARY ARTERY W/OUT ANGINA PECTORIS: ICD-10-CM

## 2021-03-01 PROCEDURE — 43239 EGD BIOPSY SINGLE/MULTIPLE: CPT

## 2021-03-01 PROCEDURE — 88305 TISSUE EXAM BY PATHOLOGIST: CPT | Mod: 26

## 2021-03-20 ENCOUNTER — APPOINTMENT (OUTPATIENT)
Dept: DISASTER EMERGENCY | Facility: CLINIC | Age: 51
End: 2021-03-20

## 2021-03-21 LAB — SARS-COV-2 N GENE NPH QL NAA+PROBE: NOT DETECTED

## 2021-03-23 ENCOUNTER — OUTPATIENT (OUTPATIENT)
Dept: OUTPATIENT SERVICES | Facility: HOSPITAL | Age: 51
LOS: 1 days | End: 2021-03-23
Payer: COMMERCIAL

## 2021-03-23 ENCOUNTER — APPOINTMENT (OUTPATIENT)
Dept: CV DIAGNOSITCS | Facility: HOSPITAL | Age: 51
End: 2021-03-23

## 2021-03-23 ENCOUNTER — APPOINTMENT (OUTPATIENT)
Dept: CV DIAGNOSTICS | Facility: HOSPITAL | Age: 51
End: 2021-03-23

## 2021-03-23 DIAGNOSIS — Z01.818 ENCOUNTER FOR OTHER PREPROCEDURAL EXAMINATION: ICD-10-CM

## 2021-03-23 DIAGNOSIS — Z98.891 HISTORY OF UTERINE SCAR FROM PREVIOUS SURGERY: Chronic | ICD-10-CM

## 2021-03-23 PROCEDURE — 93017 CV STRESS TEST TRACING ONLY: CPT

## 2021-03-23 PROCEDURE — 78452 HT MUSCLE IMAGE SPECT MULT: CPT

## 2021-03-23 PROCEDURE — 93016 CV STRESS TEST SUPVJ ONLY: CPT

## 2021-03-23 PROCEDURE — 93306 TTE W/DOPPLER COMPLETE: CPT | Mod: 26

## 2021-03-23 PROCEDURE — A9500: CPT

## 2021-03-23 PROCEDURE — 93018 CV STRESS TEST I&R ONLY: CPT

## 2021-03-23 PROCEDURE — 93306 TTE W/DOPPLER COMPLETE: CPT

## 2021-03-23 PROCEDURE — 78452 HT MUSCLE IMAGE SPECT MULT: CPT | Mod: 26

## 2021-03-24 PROBLEM — I25.10 CAD (CORONARY ARTERY DISEASE): Status: ACTIVE | Noted: 2021-03-24

## 2021-03-29 ENCOUNTER — APPOINTMENT (OUTPATIENT)
Dept: DISASTER EMERGENCY | Facility: CLINIC | Age: 51
End: 2021-03-29

## 2021-03-30 LAB — SARS-COV-2 N GENE NPH QL NAA+PROBE: NOT DETECTED

## 2021-03-31 ENCOUNTER — FORM ENCOUNTER (OUTPATIENT)
Age: 51
End: 2021-03-31

## 2021-04-01 ENCOUNTER — OUTPATIENT (OUTPATIENT)
Dept: OUTPATIENT SERVICES | Facility: HOSPITAL | Age: 51
LOS: 1 days | End: 2021-04-01
Payer: COMMERCIAL

## 2021-04-01 VITALS
SYSTOLIC BLOOD PRESSURE: 152 MMHG | HEART RATE: 68 BPM | RESPIRATION RATE: 17 BRPM | DIASTOLIC BLOOD PRESSURE: 89 MMHG | OXYGEN SATURATION: 97 %

## 2021-04-01 VITALS
OXYGEN SATURATION: 98 % | DIASTOLIC BLOOD PRESSURE: 84 MMHG | HEIGHT: 65 IN | RESPIRATION RATE: 20 BRPM | SYSTOLIC BLOOD PRESSURE: 145 MMHG | WEIGHT: 261.91 LBS | TEMPERATURE: 98 F | HEART RATE: 82 BPM

## 2021-04-01 DIAGNOSIS — I25.10 ATHEROSCLEROTIC HEART DISEASE OF NATIVE CORONARY ARTERY WITHOUT ANGINA PECTORIS: ICD-10-CM

## 2021-04-01 DIAGNOSIS — Z98.891 HISTORY OF UTERINE SCAR FROM PREVIOUS SURGERY: Chronic | ICD-10-CM

## 2021-04-01 PROCEDURE — 93005 ELECTROCARDIOGRAM TRACING: CPT

## 2021-04-01 PROCEDURE — C1894: CPT

## 2021-04-01 PROCEDURE — 99152 MOD SED SAME PHYS/QHP 5/>YRS: CPT

## 2021-04-01 PROCEDURE — 93454 CORONARY ARTERY ANGIO S&I: CPT

## 2021-04-01 PROCEDURE — C1887: CPT

## 2021-04-01 PROCEDURE — 84702 CHORIONIC GONADOTROPIN TEST: CPT

## 2021-04-01 PROCEDURE — 85027 COMPLETE CBC AUTOMATED: CPT

## 2021-04-01 PROCEDURE — 93454 CORONARY ARTERY ANGIO S&I: CPT | Mod: 26

## 2021-04-01 PROCEDURE — C1769: CPT

## 2021-04-01 PROCEDURE — 80048 BASIC METABOLIC PNL TOTAL CA: CPT

## 2021-04-01 PROCEDURE — 93010 ELECTROCARDIOGRAM REPORT: CPT

## 2021-04-01 NOTE — ASU DISCHARGE PLAN (ADULT/PEDIATRIC) - CARE PROVIDER_API CALL
Dex Parra ()  Cardiovascular Disease; Internal Medicine; Nuclear Cardiology  [address] [place] [address]  Phone: [phone]  Fax: [phone]  Follow Up Time:     Bettye Mullen)  Internal Medicine; Pediatrics  [address] [place] [address]  Phone: [phone]  Fax: [phone]  Follow Up Time:

## 2021-04-01 NOTE — ASU DISCHARGE PLAN (ADULT/PEDIATRIC) - ASU DC SPECIAL INSTRUCTIONSFT
Wound Care:   the day AFTER your procedure remove bandage GENTTLY, and clean using  mild soap and gentle warm, water stream, pat dry. leave OPEN to air. YOU MAY SHOWER   DO NOT apply lotions, creams, ointments, powder, parfumes to your incision site  DO NOT SOAK your site for 1 week ( no baths, no pools, no tubs, etc...)  Check  your groin and /or wirst daily.A small amount of bruising, and soarness are normal    ACTIVITY: for 24 hours   - DO NOT DRIVE  - DO NOT make any important decisions or sign legal documents   - DO NOT operate heavy machinaries   - you may resume sexual activity in 48 hours, unless otherwise instructed by your cardiologist     If your procedure was done through the WRIST: for the NEXT 3DAYS:  - avoid pushing, pulling, with that affected wrist   - avoid repeated movement of that hand and wrist ( eg: typing, hammering)  - DO NOT LIFT anything more than 5 lbs     If your procedure was done through the GROIN: for the NEXT 5 DAYS  - Limit climbing stairs, DO NOT soak in bathtub or pool  - no strenous activities, pushing, pulling, straining  - Do not lift anything 10lbs or heavier     MEDICATION:   take your medications as explained ( see discharge paperwork)   If you received a STENT, you will be taking antiplatelet medications to KEEP YOUR STENT OPEN ( eg: Aspirin, Plavix, Brilinta, Effient, etc).  Take as prescribed DO NOT STOP taking them without consulting with your cardiologist first.     Follow heart healthy diet reccomended by your doctor, , if you smoke STOP SMOKING ( may call [phone] for center of tobacco control if you need assistance)     CALL your doctor to make appointment in 2 WEEKS     ***CALL YOUR DOCTOR***  if you experience: fever, chills, body aches, or severe pain, swelling, redness, heat or yellow discharge at incision site  If you experience Bleeding or excruciating pain at the procedural site, sweliing ( golf ball size) at your procedural site  If you experience CHEST PAIN  If you experience extremity numbness, tingling, temperature change ( of your procedural site)   If you are unable to reach your doctor, you may contact:   -Cardiology Office at [facility] at [phone] or   - [facility] [phone] - [facility] [phone]

## 2021-04-01 NOTE — H&P CARDIOLOGY - RS GEN PE MLT RESP DETAILS PC
airway patent/breath sounds equal/good air movement/respirations non-labored/no chest wall tenderness

## 2021-04-01 NOTE — H&P CARDIOLOGY - HISTORY OF PRESENT ILLNESS
50 year old morbidly obese female (no implantable devices) with a PMHx of GERD, pre-diabetes (diet controlled), and migraine HA.  She had a CT of heart showing a coronary artery calcium score of 39, and an abnormal ST. She presents today for cardiac cath prior to bariatric surgery. She currently denies any complaints.     < from: Nuclear Stress Test-Exercise (Nuclear Stress Test-Exercise .) (21 @ 10:30) >    PATIENT: HOANG LANDEROS  : 1970   AGE: 50 (F)   MR#: [id number]  STUDY DATE: 2021  LOCATION: O/P  REF. PHYSICIAN(S): Dex Parra MD  FELLOW: Natalie JAIMES NP  ------------------------------------------------------------------------  TYPE OF TEST: Rest/Stress SESTAMIBI  INDICATION: Abnormal electrocardiogram (ECG) (EKG)  (R94.31)  ------------------------------------------------------------------------  HISTORY:  CARDIAC HISTORY: 50 year old woman referred for ischemic  evaluation prior to bariatric surgery.  RISK FACTORS: Elevated Cholesterol, Obesity, Post  Menopausal  MEDICATIONS: aspirin  ------------------------------------------------------------------------  BASELINE ELECTROCARDIOGRAM:  Rhythm: Sinus Rhythm- 75 BPM  Conduction Defect: 1 degree AV block  ST: Nonspecific ST-T wave abnormality.  Early  repolarization.  ------------------------------------------------------------------------  EXERCISE RESULTS:  TIME      SPEED    GRADE  HR      BP  Baseline  Standing   N/A  71  120/98  03:00     1.7 MPH    10% 122  140/90  06:00     2.5 MPH    12% 146  162/92  07:19     3.4 MPH    14% 155  01:00     Recovery       126  147/84  03:00     Recovery        90  146/81  05:18     Recovery        92  140/87  ------------------------------------------------------------------------  Protocol: Caleb   Exercise Duration: 7: 19 Min  HR: Baseline HR: 71 bpm   Peak HR: 155 bpm (92% of MPHR)  MPHR: 169 bpm   85% of MPHR: 144 bpm  BP: Baseline BP: 120/98 mmHg   Peak BP: 162/92 mmHg   Peak  RPP: 91969 (Rate Pressure Product)  Last Caffeine intake: 12 hrs  Terminated: Fatigue, Shortness of breath  Performance: 8 METS, Average for age and gender.  Comments: The patient walked on treadmill, target heart  rate was achieved.  ------------------------------------------------------------------------  SYMPTOMS/FINDINGS:  Symptoms: Shortness of breath, fatigue  Chest pain: No chest pain with exercise  ------------------------------------------------------------------------  ECG ABNORMALITIES DURING/AFTER STRESS:   ST Changes:ECG interpretation somewhat limited by  excessive artifact during exercise.  Grossly, no  significant ischemic ST segment changes beyond baseline  abnormalities on interpretable electrocardiograms.  ------------------------------------------------------------------------  NEW ARRHYTHMIAS DEVELOPED DURING/AFTER STRESS:  Rare VPDs occurred during stress.  ------------------------------------------------------------------------  STRESS TESTIMPRESSIONS:  Exercise capacity: 8 METS, Average for age and gender.  Chest Pain: No chest pain with exercise.  Symptom: Shortness of breath, fatigue.  HR Response: Appropriate.  BP Response: Appropriate.  Heart Rhythm: Sinus Rhythm - 75 BPM.  Conduction defects: 1 degree AV block.  Baseline ECG: Nonspecific ST-T wave abnormality.  Early  repolarization.  ECG Changes: ECG interpretation somewhat limited by  excessive artifact during exercise.  Grossly, no  significant ischemic ST segment changes beyond baseline  abnormalities on interpretable electrocardiograms.  Arrhythmia: Rare VPDs occurred during stress.  ------------------------------------------------------------------------  PROCEDURE:  7.4 mCi of Tc99m Sestamibi were injected intravenously at  rest. Approximately 65 minutes later, tomographic images  were obtained in a 180 degree arc from right anterior  oblique to left anterior oblique with 64 stops. At a  separate time, 20.5 mCi of Tc99m Sestamibi were injected  intravenously during stress protocol. Approximately 55  minute(s) later, tomographic images were obtained in a 180  degree arc from right anterior oblique to left anterior  oblique with 64 stops. The tomographic slices were  reconstructed in 3 orthogonal planes (short axis,  horizontal long axis and vertical long axis).  Interpretation was performed both by visual and  quantitative analysis.  Rest and stress images were  acquired and processed using ImageTag Cardiac wide beam  reconstruction software ([employer]Êat default  resolution. Images were re-acquired with the patient in a  prone position.  ------------------------------------------------------------------------  NUCLEAR FINDINGS:  Review of raw data shows: Breast attenuation artifact.  There are medium-sized, mild defects in the anterolateral  wall that are partially reversible suggestive of mild  ischemia with partial scarring.  There is a small, mild defect in the distal inferior wall  that is reversible suggestive of mild ischemia.  There is a small, moderate defect in the apex that is  mostly fixed, partially corrects with prone imaging, and  demonstrates preserved wall motion suggestive of  attenuation artifact.  ------------------------------------------------------------------------  GATED ANALYSIS:  Post-stress gated wall motion analysis was performed (LVEF  = 65 %;LVEDV = 95 ml.) revealing reduced systolic  thickneing of the anterolateral wall with normal overall  left ventricular ejection fraction.  ------------------------------------------------------------------------  IMPRESSIONS:Abnormal Study  * Exercise capacity: 8 METS, Average for age and gender.  * Chest Pain: No chest pain with exercise.  * Symptom: Shortness of breath, fatigue.  * HR Response: Appropriate.  * BP Response: Appropriate.  * Heart Rhythm: Sinus Rhythm - 75 BPM.  * Conduction defects: 1 degree AV block.    < end of copied text >

## 2021-04-13 ENCOUNTER — NON-APPOINTMENT (OUTPATIENT)
Age: 51
End: 2021-04-13

## 2021-04-13 ENCOUNTER — APPOINTMENT (OUTPATIENT)
Dept: CARDIOLOGY | Facility: CLINIC | Age: 51
End: 2021-04-13
Payer: COMMERCIAL

## 2021-04-13 VITALS — SYSTOLIC BLOOD PRESSURE: 134 MMHG | DIASTOLIC BLOOD PRESSURE: 90 MMHG

## 2021-04-13 VITALS — DIASTOLIC BLOOD PRESSURE: 89 MMHG | SYSTOLIC BLOOD PRESSURE: 142 MMHG | HEART RATE: 91 BPM | OXYGEN SATURATION: 99 %

## 2021-04-13 DIAGNOSIS — Z01.818 ENCOUNTER FOR OTHER PREPROCEDURAL EXAMINATION: ICD-10-CM

## 2021-04-13 PROCEDURE — 93000 ELECTROCARDIOGRAM COMPLETE: CPT

## 2021-04-13 PROCEDURE — 99072 ADDL SUPL MATRL&STAF TM PHE: CPT

## 2021-04-13 PROCEDURE — 99214 OFFICE O/P EST MOD 30 MIN: CPT

## 2021-04-13 NOTE — REASON FOR VISIT
[Follow-Up - Clinic] : a clinic follow-up of [FreeTextEntry1] : 50F \par \par Had LHC with Dr. Lerma, no sig coronary dz\par R radial artery site looks fine. \par Echo was normal.  \par she is on no medications\par BP is well controlled\par  no leg swelling\par \par Seeing Dr. Melchor, plans for gastric sleeve.  No date yet.  \par ECG today is stable\par   \par

## 2021-04-13 NOTE — ASSESSMENT
[FreeTextEntry1] : Assessment:\par 1.  morbid obesity \par 2.  Preoperative evaluation prior to bariatric surgery\par \par \par Plan\par 1. Cardiac testing with echo and left heart catheterization was normal, no significant coronary disease, ecg is unchanged\par 2.  She may proceed with bariatric surgery without any further cardiac testing\par 3.  DVT prophylaxis per bariatric surgeon\par 4.  Eliminate salt from the diet\par 5.  healthy diet and lifestyle\par 6.  Return post operatively, 3 months post op

## 2021-04-13 NOTE — PHYSICAL EXAM
[General Appearance - Well Developed] : well developed [Normal Appearance] : normal appearance [Well Groomed] : well groomed [General Appearance - Well Nourished] : well nourished [No Deformities] : no deformities [General Appearance - In No Acute Distress] : no acute distress [Normal Conjunctiva] : the conjunctiva exhibited no abnormalities [Eyelids - No Xanthelasma] : the eyelids demonstrated no xanthelasmas [Normal Oral Mucosa] : normal oral mucosa [No Oral Pallor] : no oral pallor [No Oral Cyanosis] : no oral cyanosis [Normal Jugular Venous A Waves Present] : normal jugular venous A waves present [Normal Jugular Venous V Waves Present] : normal jugular venous V waves present [No Jugular Venous Colón A Waves] : no jugular venous colón A waves [Respiration, Rhythm And Depth] : normal respiratory rhythm and effort [Exaggerated Use Of Accessory Muscles For Inspiration] : no accessory muscle use [Auscultation Breath Sounds / Voice Sounds] : lungs were clear to auscultation bilaterally [Heart Rate And Rhythm] : heart rate and rhythm were normal [Heart Sounds] : normal S1 and S2 [Murmurs] : no murmurs present [Abdomen Soft] : soft [Abdomen Tenderness] : non-tender [Abdomen Mass (___ Cm)] : no abdominal mass palpated [Abnormal Walk] : normal gait [Gait - Sufficient For Exercise Testing] : the gait was sufficient for exercise testing [Nail Clubbing] : no clubbing of the fingernails [Cyanosis, Localized] : no localized cyanosis [Petechial Hemorrhages (___cm)] : no petechial hemorrhages [Skin Color & Pigmentation] : normal skin color and pigmentation [] : no rash [No Venous Stasis] : no venous stasis [Skin Lesions] : no skin lesions [No Skin Ulcers] : no skin ulcer [No Xanthoma] : no  xanthoma was observed [Oriented To Time, Place, And Person] : oriented to person, place, and time [Affect] : the affect was normal [Mood] : the mood was normal [No Anxiety] : not feeling anxious

## 2021-05-01 ENCOUNTER — RESULT REVIEW (OUTPATIENT)
Age: 51
End: 2021-05-01

## 2021-05-03 ENCOUNTER — APPOINTMENT (OUTPATIENT)
Dept: SURGERY | Facility: CLINIC | Age: 51
End: 2021-05-03
Payer: COMMERCIAL

## 2021-05-03 VITALS — HEART RATE: 85 BPM | DIASTOLIC BLOOD PRESSURE: 89 MMHG | SYSTOLIC BLOOD PRESSURE: 136 MMHG

## 2021-05-03 PROCEDURE — 99215 OFFICE O/P EST HI 40 MIN: CPT

## 2021-05-03 PROCEDURE — 99072 ADDL SUPL MATRL&STAF TM PHE: CPT

## 2021-05-04 ENCOUNTER — OUTPATIENT (OUTPATIENT)
Dept: OUTPATIENT SERVICES | Facility: HOSPITAL | Age: 51
LOS: 1 days | End: 2021-05-04
Payer: COMMERCIAL

## 2021-05-04 VITALS
HEART RATE: 69 BPM | HEIGHT: 65 IN | WEIGHT: 257.94 LBS | SYSTOLIC BLOOD PRESSURE: 141 MMHG | DIASTOLIC BLOOD PRESSURE: 88 MMHG | RESPIRATION RATE: 16 BRPM | OXYGEN SATURATION: 99 % | TEMPERATURE: 98 F

## 2021-05-04 DIAGNOSIS — Z98.891 HISTORY OF UTERINE SCAR FROM PREVIOUS SURGERY: Chronic | ICD-10-CM

## 2021-05-04 DIAGNOSIS — Z29.9 ENCOUNTER FOR PROPHYLACTIC MEASURES, UNSPECIFIED: ICD-10-CM

## 2021-05-04 DIAGNOSIS — G47.33 OBSTRUCTIVE SLEEP APNEA (ADULT) (PEDIATRIC): ICD-10-CM

## 2021-05-04 DIAGNOSIS — E66.01 MORBID (SEVERE) OBESITY DUE TO EXCESS CALORIES: ICD-10-CM

## 2021-05-04 DIAGNOSIS — Z01.818 ENCOUNTER FOR OTHER PREPROCEDURAL EXAMINATION: ICD-10-CM

## 2021-05-04 LAB
A1C WITH ESTIMATED AVERAGE GLUCOSE RESULT: 6 % — HIGH (ref 4–5.6)
ALBUMIN SERPL ELPH-MCNC: 4.1 G/DL — SIGNIFICANT CHANGE UP (ref 3.3–5)
ALP SERPL-CCNC: 102 U/L — SIGNIFICANT CHANGE UP (ref 40–120)
ALT FLD-CCNC: 26 U/L — SIGNIFICANT CHANGE UP (ref 10–45)
ANION GAP SERPL CALC-SCNC: 14 MMOL/L — SIGNIFICANT CHANGE UP (ref 5–17)
AST SERPL-CCNC: 23 U/L — SIGNIFICANT CHANGE UP (ref 10–40)
BILIRUB SERPL-MCNC: 0.3 MG/DL — SIGNIFICANT CHANGE UP (ref 0.2–1.2)
BLD GP AB SCN SERPL QL: NEGATIVE — SIGNIFICANT CHANGE UP
BUN SERPL-MCNC: 13 MG/DL — SIGNIFICANT CHANGE UP (ref 7–23)
CALCIUM SERPL-MCNC: 9.6 MG/DL — SIGNIFICANT CHANGE UP (ref 8.4–10.5)
CHLORIDE SERPL-SCNC: 102 MMOL/L — SIGNIFICANT CHANGE UP (ref 96–108)
CO2 SERPL-SCNC: 23 MMOL/L — SIGNIFICANT CHANGE UP (ref 22–31)
CREAT SERPL-MCNC: 0.69 MG/DL — SIGNIFICANT CHANGE UP (ref 0.5–1.3)
ESTIMATED AVERAGE GLUCOSE: 126 MG/DL — HIGH (ref 68–114)
GLUCOSE SERPL-MCNC: 89 MG/DL — SIGNIFICANT CHANGE UP (ref 70–99)
HCT VFR BLD CALC: 39.4 % — SIGNIFICANT CHANGE UP (ref 34.5–45)
HGB BLD-MCNC: 12.5 G/DL — SIGNIFICANT CHANGE UP (ref 11.5–15.5)
MCHC RBC-ENTMCNC: 28.1 PG — SIGNIFICANT CHANGE UP (ref 27–34)
MCHC RBC-ENTMCNC: 31.7 GM/DL — LOW (ref 32–36)
MCV RBC AUTO: 88.5 FL — SIGNIFICANT CHANGE UP (ref 80–100)
NRBC # BLD: 0 /100 WBCS — SIGNIFICANT CHANGE UP (ref 0–0)
PLATELET # BLD AUTO: 334 K/UL — SIGNIFICANT CHANGE UP (ref 150–400)
POTASSIUM SERPL-MCNC: 3.8 MMOL/L — SIGNIFICANT CHANGE UP (ref 3.5–5.3)
POTASSIUM SERPL-SCNC: 3.8 MMOL/L — SIGNIFICANT CHANGE UP (ref 3.5–5.3)
PROT SERPL-MCNC: 7.5 G/DL — SIGNIFICANT CHANGE UP (ref 6–8.3)
RBC # BLD: 4.45 M/UL — SIGNIFICANT CHANGE UP (ref 3.8–5.2)
RBC # FLD: 13.7 % — SIGNIFICANT CHANGE UP (ref 10.3–14.5)
RH IG SCN BLD-IMP: POSITIVE — SIGNIFICANT CHANGE UP
SODIUM SERPL-SCNC: 139 MMOL/L — SIGNIFICANT CHANGE UP (ref 135–145)
WBC # BLD: 5.86 K/UL — SIGNIFICANT CHANGE UP (ref 3.8–10.5)
WBC # FLD AUTO: 5.86 K/UL — SIGNIFICANT CHANGE UP (ref 3.8–10.5)

## 2021-05-04 PROCEDURE — 86901 BLOOD TYPING SEROLOGIC RH(D): CPT

## 2021-05-04 PROCEDURE — 86900 BLOOD TYPING SEROLOGIC ABO: CPT

## 2021-05-04 PROCEDURE — 83036 HEMOGLOBIN GLYCOSYLATED A1C: CPT

## 2021-05-04 PROCEDURE — 86850 RBC ANTIBODY SCREEN: CPT

## 2021-05-04 PROCEDURE — 80053 COMPREHEN METABOLIC PANEL: CPT

## 2021-05-04 PROCEDURE — 85027 COMPLETE CBC AUTOMATED: CPT

## 2021-05-04 PROCEDURE — G0463: CPT

## 2021-05-04 RX ORDER — CEFAZOLIN SODIUM 1 G
2000 VIAL (EA) INJECTION ONCE
Refills: 0 | Status: COMPLETED | OUTPATIENT
Start: 2021-05-18 | End: 2021-05-18

## 2021-05-04 NOTE — H&P PST ADULT - NSICDXPROBLEM_GEN_ALL_CORE_FT
PROBLEM DIAGNOSES  Problem: Morbid obesity  Assessment and Plan: planned for laparoscopic vertical sleeve gastrectomy on 5/4/2021   PST labs send  preprocedure surgical scrub incentive spirometry instructions discussed   recent EKG/echo/cath report on [facility]  patient has paper instructions to stop prophylaxis aspirin for 5 days prior to sx, last dose 5/11/2021, patient will verify with Dr. Melchor     Problem: YUMIKO (obstructive sleep apnea)  Assessment and Plan: OR booking notified     Problem: Need for prophylactic measure  Assessment and Plan: The Caprini score indicates this patient is at risk for a VTE event (score 3-5).  Most surgical patients in this group would benefit from pharmacologic prophylaxis.  The surgical team will determine the balance between VTE risk and bleeding risk         PROBLEM DIAGNOSES  Problem: Morbid obesity  Assessment and Plan: planned for laparoscopic vertical sleeve gastrectomy on 5/18/2021   PST labs send  preprocedure surgical scrub incentive spirometry instructions discussed   recent EKG/echo/cath report on [facility]  patient has paper instructions to stop prophylaxis aspirin for 5 days prior to sx, last dose 5/11/2021, patient will verify with Dr. Melchor     Problem: YUMIKO (obstructive sleep apnea)  Assessment and Plan: OR booking notified     Problem: Need for prophylactic measure  Assessment and Plan: The Caprini score indicates this patient is at risk for a VTE event (score 3-5).  Most surgical patients in this group would benefit from pharmacologic prophylaxis.  The surgical team will determine the balance between VTE risk and bleeding risk

## 2021-05-04 NOTE — PHARMACOTHERAPY INTERVENTION NOTE - COMMENTS
Vertical sleeve gastrectomy scheduled for 5/18/2021.  Patient medication reconciliation completed. Patient currently taking:     Home Medications:  Aspirin Enteric Coated 81 mg oral delayed release tablet: 1 tab(s) orally once a day, last dose 5/11/2021  Vitamin B-12 1000 mcg oral tablet: 1 tab(s) orally once a day  Vitamin D3 2000 intl units (50 mcg) oral tablet: 1 tab(s) orally once a day  Calcium daily  Biotin daily  Tylenol PRN    Patient was instructed to use crushed, dissolvable, chewable, or liquid formulations of medications for 1 month. Patient was informed to take daily multivitamins post surgically. Patient reeducated on NSAID avoidance (ibuprofen, ASA, naproxen, aleve) as they increased risk of GI bleeding; may use APAP for mild pain otherwise contact prescriber for consult. Patient was informed on indications and directions for administration for hyoscyamine SL, acetaminophen liquid, ondansetron ODT, and omeprazole DR. Patient was instructed to take the medications as follows:     Continue vitamins/supplements in chewable/dissolvable forms (Vitamin D, vitamin B12, biotin, calcium)  Switch aspirin DR tabs to chewable tabs after surgery    Jayme Johnson, PharmD, BCPS  [phone]

## 2021-05-04 NOTE — H&P PST ADULT - ASSESSMENT
SEBASTIANI VTE 2.0 SCORE [CLOT updated 2019]    AGE RELATED RISK FACTORS                                                       MOBILITY RELATED FACTORS  [ x] Age 41-60 years                                            (1 Point)                    [ ] Bed rest                                                        (1 Point)  [ ] Age: 61-74 years                                           (2 Points)                  [ ] Plaster cast                                                   (2 Points)  [ ] Age= 75 years                                              (3 Points)                    [ ] Bed bound for more than 72 hours                 (2 Points)    DISEASE RELATED RISK FACTORS                                               GENDER SPECIFIC FACTORS  [ ] Edema in the lower extremities                       (1 Point)              [ ] Pregnancy                                                     (1 Point)  [ ] Varicose veins                                               (1 Point)                     [ ] Post-partum < 6 weeks                                   (1 Point)             [x ] BMI > 25 Kg/m2                                            (1 Point)                     [ ] Hormonal therapy  or oral contraception          (1 Point)                 [ ] Sepsis (in the previous month)                        (1 Point)               [ ] History of pregnancy complications                 (1 point)  [ ] Pneumonia or serious lung disease                                               [ ] Unexplained or recurrent                     (1 Point)           (in the previous month)                               (1 Point)  [ ] Abnormal pulmonary function test                     (1 Point)                 SURGERY RELATED RISK FACTORS  [ ] Acute myocardial infarction                              (1 Point)               [ ]  Section                                             (1 Point)  [ ] Congestive heart failure (in the previous month)  (1 Point)      [ ] Minor surgery                                                  (1 Point)   [ ] Inflammatory bowel disease                             (1 Point)               [ ] Arthroscopic surgery                                        (2 Points)  [ ] Central venous access                                      (2 Points)                [ x] General surgery lasting more than 45 minutes (2 points)  [ ] Malignancy- Present or previous                   (2 Points)                [ ] Elective arthroplasty                                         (5 points)    [ ] Stroke (in the previous month)                          (5 Points)                                                                                                                                                           HEMATOLOGY RELATED FACTORS                                                 TRAUMA RELATED RISK FACTORS  [ ] Prior episodes of VTE                                     (3 Points)                [ ] Fracture of the hip, pelvis, or leg                       (5 Points)  [ ] Positive family history for VTE                         (3 Points)             [ ] Acute spinal cord injury (in the previous month)  (5 Points)  [ ] Prothrombin 15396 A                                     (3 Points)               [ ] Paralysis  (less than 1 month)                             (5 Points)  [ ] Factor V Leiden                                             (3 Points)                  [ ] Multiple Trauma within 1 month                        (5 Points)  [ ] Lupus anticoagulants                                     (3 Points)                                                           [ ] Anticardiolipin antibodies                               (3 Points)                                                       [ ] High homocysteine in the blood                      (3 Points)                                             [ ] Other congenital or acquired thrombophilia      (3 Points)                                                [ ] Heparin induced thrombocytopenia                  (3 Points)                                     Total Score [    4      ]

## 2021-05-04 NOTE — H&P PST ADULT - HISTORY OF PRESENT ILLNESS
50 year old morbidly obese female (no implantable devices) with a PMHx of GERD, pre-diabetes (diet controlled), and migraine HA, mild YUMIKO no cpap use  She had a CT of heart showing a coronary artery calcium score of 39, and an abnormal ST s/p cardiac cath 4/1/2021 no acute findings planned for lapa    **** Covid testing 5/15/2021  ***Denies any recent covid exposure or recent travels  50 year old morbidly obese female with a PMHx of GERD, pre-diabetes (diet controlled), migraine HA, mild YUMIKO no cpap use, hx of abnormal ST s/p cardiac cath 4/1/2021 no acute findings planned for laparoscopic vertical sleeve gastrectomy on 5/4/2021     **** Covid testing 5/15/2021@ [facility]   ***Denies any recent covid exposure or recent travels     ****Patient has instructions to stop aspirin for 5 days, patient will verify with Dr. Melchor.   ***Jayme Pharmacist came to PST  50 year old morbidly obese female with a PMHx of GERD, pre-diabetes (diet controlled), migraine HA, mild YUMIKO no cpap use, hx of abnormal ST s/p cardiac cath 4/1/2021 no acute findings planned for laparoscopic vertical sleeve gastrectomy on 5/18/2021     **** Covid testing 5/15/2021@ [facility]   ***Denies any recent covid exposure or recent travels     ****Patient has instructions to stop aspirin for 5 days, patient will verify with Dr. Melchor.   ***Jayme Pharmacist came to PST

## 2021-05-04 NOTE — H&P PST ADULT - LAST CARDIAC ANGIOGRAM/IMAGING
4/1/2021 4/1/2021 no significant findings 4/1/2021 no significant findings, minor luminal irregularities

## 2021-05-04 NOTE — H&P PST ADULT - GASTROINTESTINAL DETAILS
soft/nontender/no distention/bowel sounds normal soft/nontender/no distention/bowel sounds normal/no guarding

## 2021-05-04 NOTE — H&P PST ADULT - NSICDXPASTMEDICALHX_GEN_ALL_CORE_FT
PAST MEDICAL HISTORY:  GERD (gastroesophageal reflux disease)     Migraine     Pre-diabetes      PAST MEDICAL HISTORY:  GERD (gastroesophageal reflux disease)     Migraine     Morbid obesity     YUMIKO (obstructive sleep apnea) no cpap use    Pre-diabetes

## 2021-05-05 PROBLEM — G47.33 OBSTRUCTIVE SLEEP APNEA (ADULT) (PEDIATRIC): Chronic | Status: ACTIVE | Noted: 2021-05-04

## 2021-05-05 PROBLEM — E66.01 MORBID (SEVERE) OBESITY DUE TO EXCESS CALORIES: Chronic | Status: ACTIVE | Noted: 2021-05-04

## 2021-05-15 ENCOUNTER — OUTPATIENT (OUTPATIENT)
Dept: OUTPATIENT SERVICES | Facility: HOSPITAL | Age: 51
LOS: 1 days | End: 2021-05-15
Payer: COMMERCIAL

## 2021-05-15 DIAGNOSIS — Z98.891 HISTORY OF UTERINE SCAR FROM PREVIOUS SURGERY: Chronic | ICD-10-CM

## 2021-05-15 DIAGNOSIS — Z11.52 ENCOUNTER FOR SCREENING FOR COVID-19: ICD-10-CM

## 2021-05-15 LAB — SARS-COV-2 RNA SPEC QL NAA+PROBE: SIGNIFICANT CHANGE UP

## 2021-05-15 PROCEDURE — U0005: CPT

## 2021-05-15 PROCEDURE — C9803: CPT

## 2021-05-15 PROCEDURE — U0003: CPT

## 2021-05-17 ENCOUNTER — TRANSCRIPTION ENCOUNTER (OUTPATIENT)
Age: 51
End: 2021-05-17

## 2021-05-18 ENCOUNTER — RESULT REVIEW (OUTPATIENT)
Age: 51
End: 2021-05-18

## 2021-05-18 ENCOUNTER — INPATIENT (INPATIENT)
Facility: HOSPITAL | Age: 51
LOS: 0 days | Discharge: HOME | DRG: 621 | End: 2021-05-19
Attending: SURGERY | Admitting: SURGERY
Payer: COMMERCIAL

## 2021-05-18 ENCOUNTER — APPOINTMENT (OUTPATIENT)
Dept: SURGERY | Facility: HOSPITAL | Age: 51
End: 2021-05-18
Payer: COMMERCIAL

## 2021-05-18 VITALS
DIASTOLIC BLOOD PRESSURE: 76 MMHG | RESPIRATION RATE: 18 BRPM | TEMPERATURE: 97 F | OXYGEN SATURATION: 98 % | SYSTOLIC BLOOD PRESSURE: 113 MMHG | HEIGHT: 65 IN | WEIGHT: 251.55 LBS | HEART RATE: 71 BPM

## 2021-05-18 DIAGNOSIS — E66.01 MORBID (SEVERE) OBESITY DUE TO EXCESS CALORIES: ICD-10-CM

## 2021-05-18 DIAGNOSIS — Z98.891 HISTORY OF UTERINE SCAR FROM PREVIOUS SURGERY: Chronic | ICD-10-CM

## 2021-05-18 LAB
ANION GAP SERPL CALC-SCNC: 14 MMOL/L — SIGNIFICANT CHANGE UP (ref 5–17)
BASOPHILS # BLD AUTO: 0.02 K/UL — SIGNIFICANT CHANGE UP (ref 0–0.2)
BASOPHILS NFR BLD AUTO: 0.2 % — SIGNIFICANT CHANGE UP (ref 0–2)
BUN SERPL-MCNC: 7 MG/DL — SIGNIFICANT CHANGE UP (ref 7–23)
CALCIUM SERPL-MCNC: 9 MG/DL — SIGNIFICANT CHANGE UP (ref 8.4–10.5)
CHLORIDE SERPL-SCNC: 105 MMOL/L — SIGNIFICANT CHANGE UP (ref 96–108)
CO2 SERPL-SCNC: 21 MMOL/L — LOW (ref 22–31)
CREAT SERPL-MCNC: 0.82 MG/DL — SIGNIFICANT CHANGE UP (ref 0.5–1.3)
EOSINOPHIL # BLD AUTO: 0.08 K/UL — SIGNIFICANT CHANGE UP (ref 0–0.5)
EOSINOPHIL NFR BLD AUTO: 0.8 % — SIGNIFICANT CHANGE UP (ref 0–6)
GLUCOSE BLDC GLUCOMTR-MCNC: 93 MG/DL — SIGNIFICANT CHANGE UP (ref 70–99)
GLUCOSE SERPL-MCNC: 122 MG/DL — HIGH (ref 70–99)
HCG UR QL: NEGATIVE — SIGNIFICANT CHANGE UP
HCT VFR BLD CALC: 40.1 % — SIGNIFICANT CHANGE UP (ref 34.5–45)
HGB BLD-MCNC: 13 G/DL — SIGNIFICANT CHANGE UP (ref 11.5–15.5)
IMM GRANULOCYTES NFR BLD AUTO: 0.6 % — SIGNIFICANT CHANGE UP (ref 0–1.5)
LYMPHOCYTES # BLD AUTO: 2.81 K/UL — SIGNIFICANT CHANGE UP (ref 1–3.3)
LYMPHOCYTES # BLD AUTO: 26.9 % — SIGNIFICANT CHANGE UP (ref 13–44)
MAGNESIUM SERPL-MCNC: 2 MG/DL — SIGNIFICANT CHANGE UP (ref 1.6–2.6)
MCHC RBC-ENTMCNC: 29 PG — SIGNIFICANT CHANGE UP (ref 27–34)
MCHC RBC-ENTMCNC: 32.4 GM/DL — SIGNIFICANT CHANGE UP (ref 32–36)
MCV RBC AUTO: 89.3 FL — SIGNIFICANT CHANGE UP (ref 80–100)
MONOCYTES # BLD AUTO: 0.48 K/UL — SIGNIFICANT CHANGE UP (ref 0–0.9)
MONOCYTES NFR BLD AUTO: 4.6 % — SIGNIFICANT CHANGE UP (ref 2–14)
NEUTROPHILS # BLD AUTO: 6.98 K/UL — SIGNIFICANT CHANGE UP (ref 1.8–7.4)
NEUTROPHILS NFR BLD AUTO: 66.9 % — SIGNIFICANT CHANGE UP (ref 43–77)
NRBC # BLD: 0 /100 WBCS — SIGNIFICANT CHANGE UP (ref 0–0)
PHOSPHATE SERPL-MCNC: 2.6 MG/DL — SIGNIFICANT CHANGE UP (ref 2.5–4.5)
PLATELET # BLD AUTO: 244 K/UL — SIGNIFICANT CHANGE UP (ref 150–400)
POTASSIUM SERPL-MCNC: 4.2 MMOL/L — SIGNIFICANT CHANGE UP (ref 3.5–5.3)
POTASSIUM SERPL-SCNC: 4.2 MMOL/L — SIGNIFICANT CHANGE UP (ref 3.5–5.3)
RBC # BLD: 4.49 M/UL — SIGNIFICANT CHANGE UP (ref 3.8–5.2)
RBC # FLD: 13.9 % — SIGNIFICANT CHANGE UP (ref 10.3–14.5)
RH IG SCN BLD-IMP: POSITIVE — SIGNIFICANT CHANGE UP
SODIUM SERPL-SCNC: 140 MMOL/L — SIGNIFICANT CHANGE UP (ref 135–145)
WBC # BLD: 10.43 K/UL — SIGNIFICANT CHANGE UP (ref 3.8–10.5)
WBC # FLD AUTO: 10.43 K/UL — SIGNIFICANT CHANGE UP (ref 3.8–10.5)

## 2021-05-18 PROCEDURE — 43775 LAP SLEEVE GASTRECTOMY: CPT

## 2021-05-18 PROCEDURE — 88305 TISSUE EXAM BY PATHOLOGIST: CPT | Mod: 26

## 2021-05-18 RX ORDER — SODIUM CHLORIDE 9 MG/ML
3 INJECTION INTRAMUSCULAR; INTRAVENOUS; SUBCUTANEOUS EVERY 8 HOURS
Refills: 0 | Status: DISCONTINUED | OUTPATIENT
Start: 2021-05-18 | End: 2021-05-18

## 2021-05-18 RX ORDER — ACETAMINOPHEN 500 MG
1000 TABLET ORAL ONCE
Refills: 0 | Status: COMPLETED | OUTPATIENT
Start: 2021-05-18 | End: 2021-05-18

## 2021-05-18 RX ORDER — SODIUM CHLORIDE 9 MG/ML
1000 INJECTION, SOLUTION INTRAVENOUS
Refills: 0 | Status: DISCONTINUED | OUTPATIENT
Start: 2021-05-18 | End: 2021-05-19

## 2021-05-18 RX ORDER — ASPIRIN/CALCIUM CARB/MAGNESIUM 324 MG
1 TABLET ORAL
Qty: 0 | Refills: 0 | DISCHARGE

## 2021-05-18 RX ORDER — PREGABALIN 225 MG/1
1 CAPSULE ORAL
Qty: 0 | Refills: 0 | DISCHARGE

## 2021-05-18 RX ORDER — HEPARIN SODIUM 5000 [USP'U]/ML
5000 INJECTION INTRAVENOUS; SUBCUTANEOUS EVERY 8 HOURS
Refills: 0 | Status: DISCONTINUED | OUTPATIENT
Start: 2021-05-18 | End: 2021-05-19

## 2021-05-18 RX ORDER — ONDANSETRON 8 MG/1
4 TABLET, FILM COATED ORAL ONCE
Refills: 0 | Status: DISCONTINUED | OUTPATIENT
Start: 2021-05-18 | End: 2021-05-18

## 2021-05-18 RX ORDER — CHLORHEXIDINE GLUCONATE 213 G/1000ML
1 SOLUTION TOPICAL ONCE
Refills: 0 | Status: DISCONTINUED | OUTPATIENT
Start: 2021-05-18 | End: 2021-05-18

## 2021-05-18 RX ORDER — HYDROMORPHONE HYDROCHLORIDE 2 MG/ML
0.25 INJECTION INTRAMUSCULAR; INTRAVENOUS; SUBCUTANEOUS
Refills: 0 | Status: DISCONTINUED | OUTPATIENT
Start: 2021-05-18 | End: 2021-05-18

## 2021-05-18 RX ORDER — CEFAZOLIN SODIUM 1 G
2000 VIAL (EA) INJECTION EVERY 8 HOURS
Refills: 0 | Status: COMPLETED | OUTPATIENT
Start: 2021-05-18 | End: 2021-05-19

## 2021-05-18 RX ORDER — LIDOCAINE HCL 20 MG/ML
0.2 VIAL (ML) INJECTION ONCE
Refills: 0 | Status: DISCONTINUED | OUTPATIENT
Start: 2021-05-18 | End: 2021-05-18

## 2021-05-18 RX ORDER — DIPHENHYDRAMINE HCL 50 MG
12.5 CAPSULE ORAL ONCE
Refills: 0 | Status: DISCONTINUED | OUTPATIENT
Start: 2021-05-18 | End: 2021-05-19

## 2021-05-18 RX ORDER — HALOPERIDOL DECANOATE 100 MG/ML
1 INJECTION INTRAMUSCULAR ONCE
Refills: 0 | Status: DISCONTINUED | OUTPATIENT
Start: 2021-05-18 | End: 2021-05-19

## 2021-05-18 RX ORDER — FOLIC ACID 0.8 MG
1 TABLET ORAL ONCE
Refills: 0 | Status: COMPLETED | OUTPATIENT
Start: 2021-05-18 | End: 2021-05-18

## 2021-05-18 RX ORDER — ACETAMINOPHEN 500 MG
1000 TABLET ORAL ONCE
Refills: 0 | Status: COMPLETED | OUTPATIENT
Start: 2021-05-19 | End: 2021-05-19

## 2021-05-18 RX ORDER — PANTOPRAZOLE SODIUM 20 MG/1
40 TABLET, DELAYED RELEASE ORAL EVERY 24 HOURS
Refills: 0 | Status: DISCONTINUED | OUTPATIENT
Start: 2021-05-18 | End: 2021-05-19

## 2021-05-18 RX ORDER — KETOROLAC TROMETHAMINE 30 MG/ML
30 SYRINGE (ML) INJECTION EVERY 6 HOURS
Refills: 0 | Status: DISCONTINUED | OUTPATIENT
Start: 2021-05-18 | End: 2021-05-19

## 2021-05-18 RX ORDER — HYOSCYAMINE SULFATE 0.13 MG
0.12 TABLET ORAL EVERY 6 HOURS
Refills: 0 | Status: DISCONTINUED | OUTPATIENT
Start: 2021-05-18 | End: 2021-05-19

## 2021-05-18 RX ORDER — FENTANYL CITRATE 50 UG/ML
25 INJECTION INTRAVENOUS
Refills: 0 | Status: DISCONTINUED | OUTPATIENT
Start: 2021-05-18 | End: 2021-05-18

## 2021-05-18 RX ORDER — HYDROMORPHONE HYDROCHLORIDE 2 MG/ML
0.5 INJECTION INTRAMUSCULAR; INTRAVENOUS; SUBCUTANEOUS
Refills: 0 | Status: DISCONTINUED | OUTPATIENT
Start: 2021-05-18 | End: 2021-05-18

## 2021-05-18 RX ORDER — THIAMINE MONONITRATE (VIT B1) 100 MG
100 TABLET ORAL ONCE
Refills: 0 | Status: COMPLETED | OUTPATIENT
Start: 2021-05-18 | End: 2021-05-18

## 2021-05-18 RX ORDER — ONDANSETRON 8 MG/1
4 TABLET, FILM COATED ORAL EVERY 6 HOURS
Refills: 0 | Status: DISCONTINUED | OUTPATIENT
Start: 2021-05-18 | End: 2021-05-19

## 2021-05-18 RX ORDER — CHOLECALCIFEROL (VITAMIN D3) 125 MCG
1 CAPSULE ORAL
Qty: 0 | Refills: 0 | DISCHARGE

## 2021-05-18 RX ORDER — SODIUM CHLORIDE 9 MG/ML
1000 INJECTION, SOLUTION INTRAVENOUS
Refills: 0 | Status: DISCONTINUED | OUTPATIENT
Start: 2021-05-18 | End: 2021-05-18

## 2021-05-18 RX ORDER — HEPARIN SODIUM 5000 [USP'U]/ML
5000 INJECTION INTRAVENOUS; SUBCUTANEOUS ONCE
Refills: 0 | Status: COMPLETED | OUTPATIENT
Start: 2021-05-18 | End: 2021-05-18

## 2021-05-18 RX ORDER — HALOPERIDOL DECANOATE 100 MG/ML
0.5 INJECTION INTRAMUSCULAR EVERY 4 HOURS
Refills: 0 | Status: DISCONTINUED | OUTPATIENT
Start: 2021-05-18 | End: 2021-05-19

## 2021-05-18 RX ADMIN — Medication 400 MILLIGRAM(S): at 21:38

## 2021-05-18 RX ADMIN — ONDANSETRON 4 MILLIGRAM(S): 8 TABLET, FILM COATED ORAL at 21:41

## 2021-05-18 RX ADMIN — HEPARIN SODIUM 5000 UNIT(S): 5000 INJECTION INTRAVENOUS; SUBCUTANEOUS at 21:39

## 2021-05-18 RX ADMIN — Medication 30 MILLIGRAM(S): at 15:28

## 2021-05-18 RX ADMIN — Medication 1000 MILLIGRAM(S): at 15:58

## 2021-05-18 RX ADMIN — HEPARIN SODIUM 5000 UNIT(S): 5000 INJECTION INTRAVENOUS; SUBCUTANEOUS at 14:13

## 2021-05-18 RX ADMIN — Medication 0.12 MILLIGRAM(S): at 23:40

## 2021-05-18 RX ADMIN — Medication 0.12 MILLIGRAM(S): at 12:29

## 2021-05-18 RX ADMIN — Medication 1 MILLIGRAM(S): at 14:13

## 2021-05-18 RX ADMIN — Medication 400 MILLIGRAM(S): at 15:28

## 2021-05-18 RX ADMIN — Medication 100 MILLIGRAM(S): at 14:13

## 2021-05-18 RX ADMIN — Medication 30 MILLIGRAM(S): at 22:10

## 2021-05-18 RX ADMIN — Medication 30 MILLIGRAM(S): at 21:40

## 2021-05-18 RX ADMIN — PANTOPRAZOLE SODIUM 40 MILLIGRAM(S): 20 TABLET, DELAYED RELEASE ORAL at 11:01

## 2021-05-18 RX ADMIN — HEPARIN SODIUM 5000 UNIT(S): 5000 INJECTION INTRAVENOUS; SUBCUTANEOUS at 06:57

## 2021-05-18 RX ADMIN — Medication 100 MILLIGRAM(S): at 17:14

## 2021-05-18 RX ADMIN — SODIUM CHLORIDE 250 MILLILITER(S): 9 INJECTION, SOLUTION INTRAVENOUS at 12:06

## 2021-05-18 RX ADMIN — HYDROMORPHONE HYDROCHLORIDE 0.25 MILLIGRAM(S): 2 INJECTION INTRAMUSCULAR; INTRAVENOUS; SUBCUTANEOUS at 11:30

## 2021-05-18 RX ADMIN — HYDROMORPHONE HYDROCHLORIDE 0.25 MILLIGRAM(S): 2 INJECTION INTRAMUSCULAR; INTRAVENOUS; SUBCUTANEOUS at 12:00

## 2021-05-18 RX ADMIN — Medication 1000 MILLIGRAM(S): at 22:08

## 2021-05-18 RX ADMIN — HYDROMORPHONE HYDROCHLORIDE 0.25 MILLIGRAM(S): 2 INJECTION INTRAMUSCULAR; INTRAVENOUS; SUBCUTANEOUS at 11:43

## 2021-05-18 RX ADMIN — Medication 0.12 MILLIGRAM(S): at 17:15

## 2021-05-18 RX ADMIN — ONDANSETRON 4 MILLIGRAM(S): 8 TABLET, FILM COATED ORAL at 15:29

## 2021-05-18 RX ADMIN — Medication 30 MILLIGRAM(S): at 15:58

## 2021-05-18 RX ADMIN — HYDROMORPHONE HYDROCHLORIDE 0.25 MILLIGRAM(S): 2 INJECTION INTRAMUSCULAR; INTRAVENOUS; SUBCUTANEOUS at 11:15

## 2021-05-18 NOTE — PROVIDER CONTACT NOTE (OTHER) - ACTION/TREATMENT ORDERED:
MD notified and aware. OK to hold off on mariam clears until pt feels more comfortable. Will continue to monitor and maintain safety.

## 2021-05-18 NOTE — CHART NOTE - NSCHARTNOTEFT_GEN_A_CORE
TEAM: [facility]     TIME:05-18-21 @ 13:50    PROCEDURE: Laparoscopic sleeve gastrectomy  SUBJECTIVE:  Patient seen and examined at bedside in PACU 3 hours post-operatively. No acute complaints. Reports she is hungry and wants to eat. No flatus. Pain well controlled. Voided spontaneously. Ambulating with RN. Denies fever, chills, SOB, CP, abdominal pain, n/v.    OBJECTIVE:  T(C): 36.1 (05-18-21 @ 10:30), Max: 36.3 (05-18-21 @ 07:11)  HR: 88 (05-18-21 @ 13:00) (71 - 88)  BP: 154/83 (05-18-21 @ 13:00) (113/76 - 166/95)  RR: 16 (05-18-21 @ 13:00) (16 - 18)  SpO2: 100% (05-18-21 @ 13:00) (98% - 100%)    Weight (kg): 114.1 (05-18-21 @ 08:24)    IN/OUT:    05-18-21 @ 07:01  -  05-18-21 @ 13:50  --------------------------------------------------------  IN: 150 mL / OUT: 300 mL / NET: -150 mL    ---------------------------------------------------------------------------------------------   PHYSICAL EXAM:   General: NAD, Lying in bed comfortably  Neuro: alert, oriented x3  HEENT: NC/AT, EOMI  Neck: Soft, supple  Cardio: RRR  Resp: Good effort  GI/Abd: Obese, soft, nontender, appropriate incisional discomfort, lap incisions c/d/i, no rebound/guarding, no masses palpated  Vascular: All 4 extremities warm.  Skin: Intact, no breakdown  Musculoskeletal: All 4 extremities moving spontaneously, no limitations, no LE edema    ---------------------------------------------------------------------------------------------   MEDICATIONS:  acetaminophen  IVPB .. 1000 milliGRAM(s) IV Intermittent once  acetaminophen  IVPB .. 1000 milliGRAM(s) IV Intermittent once  ceFAZolin   IVPB 2000 milliGRAM(s) IV Intermittent every 8 hours  ceFAZolin   IVPB 2000 milliGRAM(s) IV Intermittent once  diphenhydrAMINE   Injectable 12.5 milliGRAM(s) IV Push once PRN  folic acid Injectable 1 milliGRAM(s) IV Push once  haloperidol    Injectable 1 milliGRAM(s) IV Push once PRN  haloperidol IVPB 0.5 milliGRAM(s) IV Intermittent every 4 hours PRN  heparin   Injectable 5000 Unit(s) SubCutaneous every 8 hours  hyoscyamine SL 0.125 milliGRAM(s) SubLingual every 6 hours  ketorolac   Injectable 30 milliGRAM(s) IV Push every 6 hours  lactated ringers. 1000 milliLiter(s) IV Continuous <Continuous>  ondansetron Injectable 4 milliGRAM(s) IV Push once PRN  ondansetron Injectable 4 milliGRAM(s) IV Push every 6 hours  pantoprazole  Injectable 40 milliGRAM(s) IV Push every 24 hours  sodium chloride 0.9% 1000 milliLiter(s) IV Continuous <Continuous>  thiamine Injectable 100 milliGRAM(s) IV Push once      ---------------------------------------------------------------------------------------------   LABS:                        13.0   10.43 )-----------( 244      ( 18 May 2021 12:18 )             40.1     05-18    140  |  105  |  7   ----------------------------<  122<H>  4.2   |  21<L>  |  0.82    Ca    9.0      18 May 2021 12:18  Phos  2.6     05-18  Mg     2.0     05-18    ---------------------------------------------------------------------------------------------   ASSESSMENT  50F PMHx GERD, morbid obesity, YUMIKO now s/p lap sleeve gastrectomy, recovering well.     PLAN  - Pain control with IV medications  - NPO/IVF  - Advance to [facility] in 3 hours if no n/v  - Monitor UOP  - Continue home medications  - OOB, ambulate as tolerated  - Continue chemical VTE ppx and mechanical VTE ppx  - F/u AM labs  - Needs meds reconciled by [facility] S pharmacist    [facility]  [phone]

## 2021-05-18 NOTE — PRE-ANESTHESIA EVALUATION ADULT - LAST ECHOCARDIOGRAM
3/2021 Ef 65-70%, normal LV systolic function, normal RV size & systolic function, no wall motion abnormalities, no significant valvular pathology

## 2021-05-18 NOTE — BRIEF OPERATIVE NOTE - OPERATION/FINDINGS
sleeve gastrectomy over 36F visigi bougie with black 45 then purple 60. two sutures from omentum to staple line. clip to distal end of staple line x1.

## 2021-05-18 NOTE — PRE-ANESTHESIA EVALUATION ADULT - NSANTHPMHFT_GEN_ALL_CORE
50 year old morbidly obese female with a PMHx of GERD, pre-diabetes (diet controlled), migraine HA, mild YUMIKO no cpap use, hx of abnormal ST s/p cardiac cath 4/1/2021 no acute findings, planned for laparoscopic vertical sleeve gastrectomy on 5/18/2021

## 2021-05-19 ENCOUNTER — TRANSCRIPTION ENCOUNTER (OUTPATIENT)
Age: 51
End: 2021-05-19

## 2021-05-19 VITALS
RESPIRATION RATE: 18 BRPM | TEMPERATURE: 99 F | HEART RATE: 64 BPM | OXYGEN SATURATION: 98 % | DIASTOLIC BLOOD PRESSURE: 70 MMHG | SYSTOLIC BLOOD PRESSURE: 115 MMHG

## 2021-05-19 LAB
ANION GAP SERPL CALC-SCNC: 15 MMOL/L — SIGNIFICANT CHANGE UP (ref 5–17)
BASOPHILS # BLD AUTO: 0 K/UL — SIGNIFICANT CHANGE UP (ref 0–0.2)
BASOPHILS NFR BLD AUTO: 0 % — SIGNIFICANT CHANGE UP (ref 0–2)
BUN SERPL-MCNC: 6 MG/DL — LOW (ref 7–23)
CALCIUM SERPL-MCNC: 9.1 MG/DL — SIGNIFICANT CHANGE UP (ref 8.4–10.5)
CHLORIDE SERPL-SCNC: 104 MMOL/L — SIGNIFICANT CHANGE UP (ref 96–108)
CO2 SERPL-SCNC: 20 MMOL/L — LOW (ref 22–31)
CREAT SERPL-MCNC: 0.81 MG/DL — SIGNIFICANT CHANGE UP (ref 0.5–1.3)
EOSINOPHIL # BLD AUTO: 0 K/UL — SIGNIFICANT CHANGE UP (ref 0–0.5)
EOSINOPHIL NFR BLD AUTO: 0 % — SIGNIFICANT CHANGE UP (ref 0–6)
GLUCOSE SERPL-MCNC: 83 MG/DL — SIGNIFICANT CHANGE UP (ref 70–99)
HCT VFR BLD CALC: 38.7 % — SIGNIFICANT CHANGE UP (ref 34.5–45)
HGB BLD-MCNC: 12.5 G/DL — SIGNIFICANT CHANGE UP (ref 11.5–15.5)
LYMPHOCYTES # BLD AUTO: 1.41 K/UL — SIGNIFICANT CHANGE UP (ref 1–3.3)
LYMPHOCYTES # BLD AUTO: 14.8 % — SIGNIFICANT CHANGE UP (ref 13–44)
MAGNESIUM SERPL-MCNC: 2 MG/DL — SIGNIFICANT CHANGE UP (ref 1.6–2.6)
MANUAL SMEAR VERIFICATION: SIGNIFICANT CHANGE UP
MCHC RBC-ENTMCNC: 28.7 PG — SIGNIFICANT CHANGE UP (ref 27–34)
MCHC RBC-ENTMCNC: 32.3 GM/DL — SIGNIFICANT CHANGE UP (ref 32–36)
MCV RBC AUTO: 88.8 FL — SIGNIFICANT CHANGE UP (ref 80–100)
MONOCYTES # BLD AUTO: 0.66 K/UL — SIGNIFICANT CHANGE UP (ref 0–0.9)
MONOCYTES NFR BLD AUTO: 6.9 % — SIGNIFICANT CHANGE UP (ref 2–14)
NEUTROPHILS # BLD AUTO: 7.44 K/UL — HIGH (ref 1.8–7.4)
NEUTROPHILS NFR BLD AUTO: 78.3 % — HIGH (ref 43–77)
PHOSPHATE SERPL-MCNC: 3 MG/DL — SIGNIFICANT CHANGE UP (ref 2.5–4.5)
PLAT MORPH BLD: NORMAL — SIGNIFICANT CHANGE UP
PLATELET # BLD AUTO: 206 K/UL — SIGNIFICANT CHANGE UP (ref 150–400)
POTASSIUM SERPL-MCNC: 4 MMOL/L — SIGNIFICANT CHANGE UP (ref 3.5–5.3)
POTASSIUM SERPL-SCNC: 4 MMOL/L — SIGNIFICANT CHANGE UP (ref 3.5–5.3)
RBC # BLD: 4.36 M/UL — SIGNIFICANT CHANGE UP (ref 3.8–5.2)
RBC # FLD: 14.2 % — SIGNIFICANT CHANGE UP (ref 10.3–14.5)
RBC BLD AUTO: SIGNIFICANT CHANGE UP
SODIUM SERPL-SCNC: 139 MMOL/L — SIGNIFICANT CHANGE UP (ref 135–145)
WBC # BLD: 9.5 K/UL — SIGNIFICANT CHANGE UP (ref 3.8–10.5)
WBC # FLD AUTO: 9.5 K/UL — SIGNIFICANT CHANGE UP (ref 3.8–10.5)

## 2021-05-19 PROCEDURE — 83735 ASSAY OF MAGNESIUM: CPT

## 2021-05-19 PROCEDURE — 80048 BASIC METABOLIC PNL TOTAL CA: CPT

## 2021-05-19 PROCEDURE — C1889: CPT

## 2021-05-19 PROCEDURE — 81025 URINE PREGNANCY TEST: CPT

## 2021-05-19 PROCEDURE — C9399: CPT

## 2021-05-19 PROCEDURE — 85025 COMPLETE CBC W/AUTO DIFF WBC: CPT

## 2021-05-19 PROCEDURE — 84100 ASSAY OF PHOSPHORUS: CPT

## 2021-05-19 PROCEDURE — 82962 GLUCOSE BLOOD TEST: CPT

## 2021-05-19 PROCEDURE — 88305 TISSUE EXAM BY PATHOLOGIST: CPT

## 2021-05-19 RX ORDER — ONDANSETRON 8 MG/1
1 TABLET, FILM COATED ORAL
Qty: 21 | Refills: 0
Start: 2021-05-19

## 2021-05-19 RX ORDER — HYOSCYAMINE SULFATE 0.13 MG
1 TABLET ORAL
Qty: 28 | Refills: 0
Start: 2021-05-19

## 2021-05-19 RX ORDER — OMEPRAZOLE 10 MG/1
1 CAPSULE, DELAYED RELEASE ORAL
Qty: 30 | Refills: 0
Start: 2021-05-19 | End: 2021-06-17

## 2021-05-19 RX ORDER — ACETAMINOPHEN 500 MG
15 TABLET ORAL
Qty: 300 | Refills: 0
Start: 2021-05-19

## 2021-05-19 RX ADMIN — Medication 100 MILLIGRAM(S): at 01:38

## 2021-05-19 RX ADMIN — Medication 30 MILLIGRAM(S): at 05:14

## 2021-05-19 RX ADMIN — ONDANSETRON 4 MILLIGRAM(S): 8 TABLET, FILM COATED ORAL at 04:44

## 2021-05-19 RX ADMIN — Medication 30 MILLIGRAM(S): at 09:47

## 2021-05-19 RX ADMIN — Medication 1000 MILLIGRAM(S): at 05:56

## 2021-05-19 RX ADMIN — HEPARIN SODIUM 5000 UNIT(S): 5000 INJECTION INTRAVENOUS; SUBCUTANEOUS at 05:26

## 2021-05-19 RX ADMIN — PANTOPRAZOLE SODIUM 40 MILLIGRAM(S): 20 TABLET, DELAYED RELEASE ORAL at 12:29

## 2021-05-19 RX ADMIN — Medication 30 MILLIGRAM(S): at 04:44

## 2021-05-19 RX ADMIN — Medication 0.12 MILLIGRAM(S): at 12:30

## 2021-05-19 RX ADMIN — Medication 0.12 MILLIGRAM(S): at 05:26

## 2021-05-19 RX ADMIN — Medication 400 MILLIGRAM(S): at 05:26

## 2021-05-19 RX ADMIN — ONDANSETRON 4 MILLIGRAM(S): 8 TABLET, FILM COATED ORAL at 09:49

## 2021-05-19 RX ADMIN — Medication 30 MILLIGRAM(S): at 10:17

## 2021-05-19 RX ADMIN — Medication 400 MILLIGRAM(S): at 12:28

## 2021-05-19 NOTE — DISCHARGE NOTE NURSING/CASE MANAGEMENT/SOCIAL WORK - PATIENT PORTAL LINK FT
You can access the FollowMyHealth Patient Portal offered by [facility] by registering at the following website: [email or web address]. By joining [facility]’s FollowMyHealth portal, you will also be able to view your health information using other applications (apps) compatible with our system.

## 2021-05-19 NOTE — DIETITIAN INITIAL EVALUATION ADULT. - ORAL INTAKE PTA/DIET HISTORY
Pt reports following a full liquid diet for 2 weeks PTA as instructed by outpatient RD. Pt reports having Premier Protein, Ensure Max, soups, yogurt. Confirms NKFA. Pt denies chewing/swallowing difficulty, nausea, vomiting, diarrhea, constipation. Takes calcium, vitamin D, biotin and vitamin B12.

## 2021-05-19 NOTE — PHARMACOTHERAPY INTERVENTION NOTE - COMMENTS
Spoke to patient about absorption issues with medications and the need to crush tablets or open capsules for the next 30 days. Reinforced discussion points from previous counseling. Informed patient of new prescriptions to be sent to pharmacy.    No pre-op opioids  Post-op opioids:    PACU: 10 MME (hydromorphone IV)    2 Earnest: 0 MME  Home: 0 MME     Jayme Johnson, PharmD, BCPS  [phone] Spoke to patient about absorption issues with medications and the need to crush tablets or open capsules for the next 30 days. Reinforced discussion points from previous counseling. Informed patient of new prescriptions to be sent to pharmacy. Pill  provided.    No pre-op opioids  Post-op opioids:    PACU: 10 MME (hydromorphone IV)    2 Earnest: 0 MME  Home: 0 MME     Jayme Johnson PharmD, BCPS  [phone]

## 2021-05-19 NOTE — DIETITIAN INITIAL EVALUATION ADULT. - OTHER INFO
Pt with multiple previous wt loss attempts per chart and was unable to lose and maintain significant wt loss. Per chart, pt met with outpatient RD and weighed 256 pounds (2/6/21). Pre-surgical wt (H&P) noted as 257.9 pounds (5/4/21). Current wt of 251 pounds (5/18/21). Pt now S/P laparoscopic vertical sleeve gastrectomy. Pt denies N+V, sipping on bariatric clear liquids during RD visit. Pt with knowledge of bariatric full liquid diet and receptive to in depth review/reinforcement. Pt reports home stock of protein shakes with plans to purchase more. Pt reports purchasing the necessary vitamins/minerals in chewable/liquid/crushable form including a multivitamin with added elemental iron, calcium citrate with vitamin D, and sublingual B12. Pt was advised to take the multivitamin with elemental iron at least 2 hours apart from the calcium citrate with vitamin D for optimal absorption. Pt plans to schedule a follow up appointment with outpatient RD. Pt able to teach back all points discussed during interview.     Diet Education:   1. Laparoscopic  sleeve gastrectomy recommendations reviewed/reinforced (discharge instruction handout references). Bariatric full liquid diet reviewed- sugar free, caffeine free, no carbonated beverages, low fat, no straws, no chewing gum, 6 small meals/day gradually increasing volume, and sipping beverages slowly, no water during meals- drink water 1 hour before or after meals, meeting hydration and protein needs. Discussed vitamin/mineral supplement compliance as discussed above. 2. RD to remain available to reinforce nutrition education as requested by patient/family/caregiver.

## 2021-05-19 NOTE — PROGRESS NOTE ADULT - SUBJECTIVE AND OBJECTIVE BOX
POD #1    Overnight events:   - One episode of emesis overnight    SUBJECTIVE:  Nausea improving. Pain well-controlled. Has not been able to tolerate much clears.    OBJECTIVE:  Vital Signs Last 24 Hrs  T(C): 36.7 (19 May 2021 00:35), Max: 36.9 (18 May 2021 21:45)  T(F): 98 (19 May 2021 00:35), Max: 98.5 (18 May 2021 21:45)  HR: 78 (19 May 2021 00:35) (71 - 94)  BP: 114/68 (19 May 2021 00:35) (113/76 - 166/95)  BP(mean): 113 (18 May 2021 14:45) (86 - 133)  RR: 18 (19 May 2021 00:35) (14 - 41)  SpO2: 97% (19 May 2021 00:35) (97% - 100%)      05-18-21 @ 07:01  -  05-19-21 @ 00:48  --------------------------------------------------------  IN: 800 mL / OUT: 1575 mL / NET: -775 mL        Physical Examination:  GEN: NAD, resting quietly  PULM: symmetric chest rise bilaterally, no increased WOB  ABD: soft, appropriately tender, nondistended, no rebound or guarding, incision CDI  EXTR: no LE erythema, moving all extremities      LABS:                        13.0   10.43 )-----------( 244      ( 18 May 2021 12:18 )             40.1       05-18    140  |  105  |  7   ----------------------------<  122<H>  4.2   |  21<L>  |  0.82    Ca    9.0      18 May 2021 12:18  Phos  2.6     05-18  Mg     2.0     05-18

## 2021-05-19 NOTE — DISCHARGE NOTE PROVIDER - NSDCFUSCHEDAPPT_GEN_ALL_CORE_FT
HOANG LANDEROS ; 06/04/2021 ; [facility] Gen Surg [address]  HOANG LANDEROS ; 06/26/2021 ; [facility] [address] [address]  HOANG LANDEROS ; 06/26/2021 ; [facility] Rad  [address]

## 2021-05-19 NOTE — DISCHARGE NOTE PROVIDER - INSTRUCTIONS
Bariatric phase 1 full liquid diet starting tomorrow for 2 weeks, then thin puree for 4 weeks.  No carbonated beverages. Avoid liquids with sugar and carbohydrates. Follow up with your dietician in 30 days.

## 2021-05-19 NOTE — DISCHARGE NOTE PROVIDER - NSDCCPCAREPLAN_GEN_ALL_CORE_FT
PRINCIPAL DISCHARGE DIAGNOSIS  Diagnosis: Morbid obesity  Assessment and Plan of Treatment: Recovering from Surgery  WOUND CARE: Steri-strips have been applied to your incisions.  Do not remove these.  They will fall off as they get wet; in approximately 7-10 days.  BATHING: Please do not submerge wound underwater. You may shower and/or sponge bathe.  ACTIVITY: No heavy lifting or straining. Otherwise, you may return to your usual level of physical activity. If you are taking narcotic pain medication (such as Percocet), do NOT drive a car, operate machinery or make important decisions.  DIET: bariatric diet   NOTIFY YOUR SURGEON IF: You have any bleeding that does not stop, any pus draining from your wound, any fever (over 100.4 F) or chills, persistent nausea/vomiting, persistent diarrhea, or if your pain is not controlled on your discharge pain medications.  FOLLOW-UP:  1. Please call to make a follow-up appointment within one week of discharge with Dr. Melchor.   2. Please follow up with your primary care physician in one week regarding your hospitalization.

## 2021-05-19 NOTE — DISCHARGE NOTE PROVIDER - NSDCFUADDINST_GEN_ALL_CORE_FT
Additional instructions as explained and outlined the gastric sleeve instructions. No exercise, no heavy lifting, call office for shortness for breath chest pain, calf pain and selling, increase pain and drainage from abdominal incision sites. Bariatric full diet as described above.  Do not take any whole large pills.  Please crush medications, open granules or take suspensions. Call office [phone] for immediate medical/surgical concerns. bariatric surgery, bariatric diet, increase water intake, nutritional supplement, follow up care, physical activity.    Pharmacy Intervention:   Vertical sleeve gastrectomy scheduled for 5/18/2021.  Patient medication reconciliation completed. Patient currently taking:     Home Medications:  Aspirin Enteric Coated 81 mg oral delayed release tablet: 1 tab(s) orally once a day, last dose 5/11/2021  Vitamin B-12 1000 mcg oral tablet: 1 tab(s) orally once a day  Vitamin D3 2000 intl units (50 mcg) oral tablet: 1 tab(s) orally once a day  Calcium daily  Biotin daily  Tylenol PRN    Patient was instructed to use crushed, dissolvable, chewable, or liquid formulations of medications for 1 month. Patient was informed to take daily multivitamins post surgically. Patient reeducated on NSAID avoidance (ibuprofen, ASA, naproxen, aleve) as they increased risk of GI bleeding; may use Tylenol for mild pain otherwise contact prescriber for consult. Patient was informed on indications and directions for administration for hyoscyamine SL, acetaminophen liquid, ondansetron ODT, and omeprazole DR. Patient was instructed to take the medications as follows:     Continue vitamins/supplements in chewable/dissolvable forms (Vitamin D, vitamin B12, biotin, calcium)  Switch aspirin DR tabs to chewable tabs after surgery   Additional instructions as explained and outlined the gastric sleeve instructions. No exercise, no heavy lifting, call office for shortness for breath chest pain, calf pain and selling, increase pain and drainage from abdominal incision sites. Bariatric full diet as described above.  Do not take any whole large pills.  Please crush medications, open granules or take suspensions. Call office [phone] for immediate medical/surgical concerns. bariatric surgery, bariatric diet, increase water intake, nutritional supplement, follow up care, physical activity.    Pharmacy Intervention:   Vertical sleeve gastrectomy scheduled for 5/18/2021.  Patient medication reconciliation completed. Patient currently taking:     Home Medications:  Aspirin Enteric Coated 81 mg oral delayed release tablet: 1 tab(s) orally once a day, last dose 5/11/2021  Vitamin B-12 1000 mcg oral tablet: 1 tab(s) orally once a day  Vitamin D3 2000 intl units (50 mcg) oral tablet: 1 tab(s) orally once a day  Calcium daily  Biotin daily  Tylenol PRN    Patient was instructed to use crushed, dissolvable, chewable, or liquid formulations of medications for 1 month. Patient was informed to take daily multivitamins post surgically. Patient reeducated on NSAID avoidance (ibuprofen, ASA, naproxen, aleve) as they increased risk of GI bleeding; may use Tylenol for mild pain otherwise contact prescriber for consult. Patient was informed on indications and directions for administration for hyoscyamine SL, acetaminophen liquid, ondansetron ODT, and omeprazole DR. Patient was instructed to take the medications as follows:     Continue vitamins/supplements in chewable/dissolvable forms (Vitamin D, vitamin B12, biotin, calcium)  Switch aspirin DR tabs to chewable tabs after surgery    All medications must be crushed or capsules opened into applesauce.

## 2021-05-19 NOTE — DISCHARGE NOTE PROVIDER - NSDCACTIVITY_GEN_ALL_CORE
Do not drive or operate machinery while on pain medications./Do not drive or operate machinery/Showering allowed/Walking - Indoors allowed/No heavy lifting/straining/Walking - Outdoors allowed

## 2021-05-19 NOTE — DIETITIAN INITIAL EVALUATION ADULT. - CHIEF COMPLAINT
This is a "50F PMHx GERD, morbid obesity, YUMIKO now POD1 s/p lap sleeve gastrectomy, recovering well however with intermittent nausea."

## 2021-05-19 NOTE — DISCHARGE NOTE PROVIDER - CARE PROVIDER_API CALL
Burt Melchor)  Surgery  [address], [address]  [place] [address]  Phone: [phone]  Fax: [phone]  Follow Up Time: 1 week

## 2021-05-19 NOTE — DISCHARGE NOTE PROVIDER - HOSPITAL COURSE
On 5/18 the patient who has a history of GERD, pre-diabetes, migraine headaches, mild YUMIKO with no CPAP use, hx of abnormal ST s/p cardiac cath 4/1/21 and morbid obesity with BMI 41.9 underwent Laparoscopic Sleeve Gastrectomy. Bariatric ERAS protocol followed to include preoperative and perioperative use of DVT and SSI prophylaxis as well as multi-modal non-opioid analgesics. Patient tolerated the procedure well extubated in the operating room then transferred to the PACU in stable condition. Once hemodynamically stable and effective pain control the patient was able to ambulate with assistance. Pt was also able to void within 4 hours following the procedure. The patient was later transferred to a bariatric unit and placed on bedside continuous pulse oximetry. Pain management included IV multi-modal non-opioid analgesia then transitioned to liquid as needed. The patient tolerated bariatric clear liquid the evening of surgery.    On POD #1 patient remained stable with no acute events overnight, has effective pain control. Denies nausea and vomiting. Patient is ambulating independently and voiding as expected. The rest of the hospital course was uneventful, patient met 8-Point Bariatric Surgery D/C criteria and subsequently cleared for discharge home on POD#1.  _____ Yes/No post-discharge extended VTE prophylaxis needed, [facility] VTE Predicted Risk Stratification low (<1% ).  The patient will follow a protocol-derived staged meal progression supervised by the dietitian in the outpatient setting. Patient will follow-up with Dr. Melchor in 7-10 days, medical doctor and dietitian in 30 days. [facility] appropriate [facility] from [facility] prior to discharge. Written discharge instruction explained and given to include VTE prevention.   On 5/18 the patient who has a history of GERD, pre-diabetes, migraine headaches, mild YUMIKO with no CPAP use, hx of abnormal ST s/p cardiac cath 4/1/21 and morbid obesity with BMI 41.9 underwent Laparoscopic Sleeve Gastrectomy. Bariatric ERAS protocol followed to include preoperative and perioperative use of DVT and SSI prophylaxis as well as multi-modal non-opioid analgesics. Patient tolerated the procedure well extubated in the operating room then transferred to the PACU in stable condition. Once hemodynamically stable and effective pain control the patient was able to ambulate with assistance. Pt was also able to void within 4 hours following the procedure. The patient was later transferred to a bariatric unit and placed on bedside continuous pulse oximetry. Pain management included IV multi-modal non-opioid analgesia then transitioned to liquid as needed. The patient tolerated bariatric clear liquid the evening of surgery.    On POD #1 patient remained stable with no acute events overnight, has effective pain control. Denies nausea and vomiting. Patient is ambulating independently and voiding as expected. The rest of the hospital course was uneventful, patient met 8-Point Bariatric Surgery D/C criteria and subsequently cleared for discharge home on POD#1.  No post-discharge extended VTE prophylaxis needed, [facility] VTE Predicted Risk Stratification low (<1%, score 10).  The patient will follow a protocol-derived staged meal progression supervised by the dietitian in the outpatient setting. Patient will follow-up with Dr. Melchor in 7-10 days, medical doctor and dietitian in 30 days. All appropriate [facility] obtained from [facility] prior to discharge. Written discharge instruction explained and given to include VTE prevention.

## 2021-05-19 NOTE — PROGRESS NOTE ADULT - ASSESSMENT
50F PMHx GERD, morbid obesity, YUMIKO now POD1 s/p lap sleeve gastrectomy, recovering well however with intermittent nausea.    PLAN  - Pain control PRN  - Vlad clears as tolerated  - Continue home medications  - OOB, ambulate as tolerated  - Continue chemical VTE ppx and mechanical VTE ppx  - F/u AM labs  - Discharge when 8-point bariatric criteria met    [facility]  [id number].

## 2021-05-24 LAB — SURGICAL PATHOLOGY STUDY: SIGNIFICANT CHANGE UP

## 2021-06-03 DIAGNOSIS — Z09 ENCOUNTER FOR FOLLOW-UP EXAMINATION AFTER COMPLETED TREATMENT FOR CONDITIONS OTHER THAN MALIGNANT NEOPLASM: ICD-10-CM

## 2021-06-04 ENCOUNTER — APPOINTMENT (OUTPATIENT)
Dept: SURGERY | Facility: CLINIC | Age: 51
End: 2021-06-04
Payer: COMMERCIAL

## 2021-06-04 VITALS
HEIGHT: 65 IN | OXYGEN SATURATION: 97 % | DIASTOLIC BLOOD PRESSURE: 89 MMHG | SYSTOLIC BLOOD PRESSURE: 126 MMHG | RESPIRATION RATE: 18 BRPM | HEART RATE: 92 BPM | BODY MASS INDEX: 39.82 KG/M2 | TEMPERATURE: 97.8 F | WEIGHT: 239 LBS

## 2021-06-04 PROCEDURE — 99024 POSTOP FOLLOW-UP VISIT: CPT

## 2021-06-22 ENCOUNTER — NON-APPOINTMENT (OUTPATIENT)
Age: 51
End: 2021-06-22

## 2021-06-22 ENCOUNTER — APPOINTMENT (OUTPATIENT)
Dept: DERMATOLOGY | Facility: CLINIC | Age: 51
End: 2021-06-22
Payer: COMMERCIAL

## 2021-06-22 VITALS — BODY MASS INDEX: 39.15 KG/M2 | WEIGHT: 235 LBS | HEIGHT: 65 IN

## 2021-06-22 DIAGNOSIS — L82.1 OTHER SEBORRHEIC KERATOSIS: ICD-10-CM

## 2021-06-22 DIAGNOSIS — L81.9 DISORDER OF PIGMENTATION, UNSPECIFIED: ICD-10-CM

## 2021-06-22 PROCEDURE — 99203 OFFICE O/P NEW LOW 30 MIN: CPT

## 2021-06-22 PROCEDURE — 99072 ADDL SUPL MATRL&STAF TM PHE: CPT

## 2021-06-26 ENCOUNTER — OUTPATIENT (OUTPATIENT)
Dept: OUTPATIENT SERVICES | Facility: HOSPITAL | Age: 51
LOS: 1 days | End: 2021-06-26
Payer: COMMERCIAL

## 2021-06-26 ENCOUNTER — APPOINTMENT (OUTPATIENT)
Dept: ULTRASOUND IMAGING | Facility: IMAGING CENTER | Age: 51
End: 2021-06-26

## 2021-06-26 ENCOUNTER — APPOINTMENT (OUTPATIENT)
Dept: MAMMOGRAPHY | Facility: IMAGING CENTER | Age: 51
End: 2021-06-26
Payer: COMMERCIAL

## 2021-06-26 DIAGNOSIS — Z98.891 HISTORY OF UTERINE SCAR FROM PREVIOUS SURGERY: Chronic | ICD-10-CM

## 2021-06-26 DIAGNOSIS — Z00.8 ENCOUNTER FOR OTHER GENERAL EXAMINATION: ICD-10-CM

## 2021-06-26 PROCEDURE — 77063 BREAST TOMOSYNTHESIS BI: CPT

## 2021-06-26 PROCEDURE — 77067 SCR MAMMO BI INCL CAD: CPT | Mod: 26

## 2021-06-26 PROCEDURE — 77063 BREAST TOMOSYNTHESIS BI: CPT | Mod: 26

## 2021-06-26 PROCEDURE — 77067 SCR MAMMO BI INCL CAD: CPT

## 2021-07-14 ENCOUNTER — OUTPATIENT (OUTPATIENT)
Dept: OUTPATIENT SERVICES | Facility: HOSPITAL | Age: 51
LOS: 1 days | End: 2021-07-14
Payer: COMMERCIAL

## 2021-07-14 ENCOUNTER — APPOINTMENT (OUTPATIENT)
Dept: ULTRASOUND IMAGING | Facility: IMAGING CENTER | Age: 51
End: 2021-07-14
Payer: COMMERCIAL

## 2021-07-14 DIAGNOSIS — R31.9 HEMATURIA, UNSPECIFIED: ICD-10-CM

## 2021-07-14 DIAGNOSIS — Z98.891 HISTORY OF UTERINE SCAR FROM PREVIOUS SURGERY: Chronic | ICD-10-CM

## 2021-07-14 PROCEDURE — 76770 US EXAM ABDO BACK WALL COMP: CPT | Mod: 26

## 2021-07-14 PROCEDURE — 76770 US EXAM ABDO BACK WALL COMP: CPT

## 2021-08-09 ENCOUNTER — APPOINTMENT (OUTPATIENT)
Dept: SURGERY | Facility: CLINIC | Age: 51
End: 2021-08-09
Payer: COMMERCIAL

## 2021-08-09 PROCEDURE — 99024 POSTOP FOLLOW-UP VISIT: CPT

## 2021-11-08 NOTE — ED ADULT TRIAGE NOTE - STATUS:
I was called to discuss this patients care with Dr. Coronado. We discussed about patient's EKG, labs results and plan of care.    Ok to send home from cardiology standpoint. Will need outpatient follow up.    At this time it was deemed no formal in person cardiology consultation was necessary, however if this changes due to changes in patient condition or abnormal test results, please re-consult cardiology.    Electronically Signed by:    Zaira Downey M.D.    11/7/2021    10:32 PM    
[employer]

## 2021-11-10 ENCOUNTER — EMERGENCY (EMERGENCY)
Facility: HOSPITAL | Age: 51
LOS: 1 days | Discharge: HOME | End: 2021-11-10
Attending: EMERGENCY MEDICINE | Admitting: EMERGENCY MEDICINE
Payer: COMMERCIAL

## 2021-11-10 VITALS
HEIGHT: 65 IN | TEMPERATURE: 98 F | SYSTOLIC BLOOD PRESSURE: 130 MMHG | RESPIRATION RATE: 16 BRPM | DIASTOLIC BLOOD PRESSURE: 83 MMHG | HEART RATE: 75 BPM | OXYGEN SATURATION: 98 %

## 2021-11-10 VITALS
SYSTOLIC BLOOD PRESSURE: 130 MMHG | OXYGEN SATURATION: 100 % | DIASTOLIC BLOOD PRESSURE: 81 MMHG | RESPIRATION RATE: 16 BRPM | HEART RATE: 75 BPM

## 2021-11-10 DIAGNOSIS — Z98.891 HISTORY OF UTERINE SCAR FROM PREVIOUS SURGERY: Chronic | ICD-10-CM

## 2021-11-10 LAB
ALBUMIN SERPL ELPH-MCNC: 3.7 G/DL — SIGNIFICANT CHANGE UP (ref 3.3–5)
ALP SERPL-CCNC: 107 U/L — SIGNIFICANT CHANGE UP (ref 40–120)
ALT FLD-CCNC: 25 U/L — SIGNIFICANT CHANGE UP (ref 4–33)
ANION GAP SERPL CALC-SCNC: 9 MMOL/L — SIGNIFICANT CHANGE UP (ref 7–14)
AST SERPL-CCNC: 20 U/L — SIGNIFICANT CHANGE UP (ref 4–32)
BASOPHILS # BLD AUTO: 0.02 K/UL — SIGNIFICANT CHANGE UP (ref 0–0.2)
BASOPHILS NFR BLD AUTO: 0.5 % — SIGNIFICANT CHANGE UP (ref 0–2)
BILIRUB SERPL-MCNC: <0.2 MG/DL — SIGNIFICANT CHANGE UP (ref 0.2–1.2)
BUN SERPL-MCNC: 12 MG/DL — SIGNIFICANT CHANGE UP (ref 7–23)
CALCIUM SERPL-MCNC: 9.5 MG/DL — SIGNIFICANT CHANGE UP (ref 8.4–10.5)
CHLORIDE SERPL-SCNC: 106 MMOL/L — SIGNIFICANT CHANGE UP (ref 98–107)
CO2 SERPL-SCNC: 27 MMOL/L — SIGNIFICANT CHANGE UP (ref 22–31)
CREAT SERPL-MCNC: 0.66 MG/DL — SIGNIFICANT CHANGE UP (ref 0.5–1.3)
EOSINOPHIL # BLD AUTO: 0.18 K/UL — SIGNIFICANT CHANGE UP (ref 0–0.5)
EOSINOPHIL NFR BLD AUTO: 4.2 % — SIGNIFICANT CHANGE UP (ref 0–6)
GLUCOSE SERPL-MCNC: 92 MG/DL — SIGNIFICANT CHANGE UP (ref 70–99)
HCT VFR BLD CALC: 36.5 % — SIGNIFICANT CHANGE UP (ref 34.5–45)
HGB BLD-MCNC: 11.5 G/DL — SIGNIFICANT CHANGE UP (ref 11.5–15.5)
IANC: 1.52 K/UL — SIGNIFICANT CHANGE UP (ref 1.5–8.5)
IMM GRANULOCYTES NFR BLD AUTO: 0 % — SIGNIFICANT CHANGE UP (ref 0–1.5)
LYMPHOCYTES # BLD AUTO: 2.23 K/UL — SIGNIFICANT CHANGE UP (ref 1–3.3)
LYMPHOCYTES # BLD AUTO: 52.3 % — HIGH (ref 13–44)
MCHC RBC-ENTMCNC: 29.2 PG — SIGNIFICANT CHANGE UP (ref 27–34)
MCHC RBC-ENTMCNC: 31.5 GM/DL — LOW (ref 32–36)
MCV RBC AUTO: 92.6 FL — SIGNIFICANT CHANGE UP (ref 80–100)
MONOCYTES # BLD AUTO: 0.31 K/UL — SIGNIFICANT CHANGE UP (ref 0–0.9)
MONOCYTES NFR BLD AUTO: 7.3 % — SIGNIFICANT CHANGE UP (ref 2–14)
NEUTROPHILS # BLD AUTO: 1.52 K/UL — LOW (ref 1.8–7.4)
NEUTROPHILS NFR BLD AUTO: 35.7 % — LOW (ref 43–77)
NRBC # BLD: 0 /100 WBCS — SIGNIFICANT CHANGE UP
NRBC # FLD: 0 K/UL — SIGNIFICANT CHANGE UP
PLATELET # BLD AUTO: 266 K/UL — SIGNIFICANT CHANGE UP (ref 150–400)
POTASSIUM SERPL-MCNC: 4 MMOL/L — SIGNIFICANT CHANGE UP (ref 3.5–5.3)
POTASSIUM SERPL-SCNC: 4 MMOL/L — SIGNIFICANT CHANGE UP (ref 3.5–5.3)
PROT SERPL-MCNC: 6.5 G/DL — SIGNIFICANT CHANGE UP (ref 6–8.3)
RBC # BLD: 3.94 M/UL — SIGNIFICANT CHANGE UP (ref 3.8–5.2)
RBC # FLD: 13.5 % — SIGNIFICANT CHANGE UP (ref 10.3–14.5)
SODIUM SERPL-SCNC: 142 MMOL/L — SIGNIFICANT CHANGE UP (ref 135–145)
TROPONIN T, HIGH SENSITIVITY RESULT: <6 NG/L — SIGNIFICANT CHANGE UP
WBC # BLD: 4.26 K/UL — SIGNIFICANT CHANGE UP (ref 3.8–10.5)
WBC # FLD AUTO: 4.26 K/UL — SIGNIFICANT CHANGE UP (ref 3.8–10.5)

## 2021-11-10 PROCEDURE — 71046 X-RAY EXAM CHEST 2 VIEWS: CPT | Mod: 26

## 2021-11-10 PROCEDURE — 99285 EMERGENCY DEPT VISIT HI MDM: CPT | Mod: 25

## 2021-11-10 PROCEDURE — 93010 ELECTROCARDIOGRAM REPORT: CPT

## 2021-11-10 RX ORDER — FAMOTIDINE 10 MG/ML
20 INJECTION INTRAVENOUS ONCE
Refills: 0 | Status: COMPLETED | OUTPATIENT
Start: 2021-11-10 | End: 2021-11-10

## 2021-11-10 RX ADMIN — FAMOTIDINE 20 MILLIGRAM(S): 10 INJECTION INTRAVENOUS at 11:53

## 2021-11-10 NOTE — ED PROVIDER NOTE - OBJECTIVE STATEMENT
Sandrita: GERD. Remote gastric bypass last year, p/w substernal chest pressure intermittent 5/10 intensity starting this AM. Ate fried egg this AM before pain started. Not likely PNA: no infectious Sx (eg, purulent cough). Not likely PE or other VTE: PERC or Wells low score, EKG no e/o R-heart strain. Slight dizziness. No SOB/N/V/sweating. Got ASA/NTG by EMS despite not having pain at the time. Sandrita: GERD. Remote gastric bypass last year, p/w substernal, burning chest pain going up center of chest, intermittent 5/10 intensity starting this AM. Ate fried egg this AM before pain started. Not likely PNA: no infectious Sx (eg, purulent cough). Not likely PE or other VTE: PERC or Wells low score, EKG no e/o R-heart strain. Slight dizziness. No SOB/N/V/sweating. Got ASA/NTG by EMS despite not having pain at the time.

## 2021-11-10 NOTE — ED PROVIDER NOTE - ATTENDING CONTRIBUTION TO CARE
I performed a face-to-face evaluation of the patient and performed a history and physical examination. I agree with the history and physical examination.    Consider CAD (check trop, EKG, CXR). No discomfort now, so no need for Maalox/Pepcid. Re-assess.

## 2021-11-10 NOTE — ED PROVIDER NOTE - PATIENT PORTAL LINK FT
You can access the FollowMyHealth Patient Portal offered by [facility] by registering at the following website: [email or web address]. By joining [employer]’s FollowMyHealth portal, you will also be able to view your health information using other applications (apps) compatible with our system.

## 2021-11-10 NOTE — ED ADULT TRIAGE NOTE - CHIEF COMPLAINT QUOTE
Pt c/o intermittent chest pain  , non radiating started  at 7am, and I episode of dizziness  .  nitro and asp given by EMS with some relief.  Denies N/V, chills , cough

## 2021-11-10 NOTE — ED PROVIDER NOTE - NSFOLLOWUPINSTRUCTIONS_ED_ALL_ED_FT
Follow up with your primary care doctor and cardiologist within 1 week.  Show copies of your reports given to you.   Take all of your other medications as previously prescribed.   Worsening or continued chest pain, shortness of breath, weakness,  or any other concerns return to ED.

## 2021-11-10 NOTE — ED PROVIDER NOTE - NSICDXPASTMEDICALHX_GEN_ALL_CORE_FT
PAST MEDICAL HISTORY:  GERD (gastroesophageal reflux disease)     Migraine     Morbid obesity     YUMIKO (obstructive sleep apnea) no cpap use    Pre-diabetes

## 2021-11-10 NOTE — ED ADULT NURSE NOTE - OBJECTIVE STATEMENT
pt with no PMH c/o chest pain since 7 am this morning. received asa and nitro by ems. denies cp/sob at this time. respirations even and unlabored. vss. NSR On CM. labs sent. IVSL in place. NAD noted. call bell within reach. will monitor.

## 2021-11-10 NOTE — ED ADULT NURSE NOTE - NSIMPLEMENTINTERV_GEN_ALL_ED
Implemented All Universal Safety Interventions:  [place] to call system. Call bell, personal items and telephone within reach. Instruct patient to call for assistance. Room bathroom lighting operational. Non-slip footwear when patient is off stretcher. Physically safe environment: no spills, clutter or unnecessary equipment. Stretcher in lowest position, wheels locked, appropriate side rails in place.

## 2021-11-10 NOTE — ED PROVIDER NOTE - PROGRESS NOTE DETAILS
OBDULIO Pritchett: Labs and imaging reviewed with attg, pt has a cardiologist to follow up with at [facility]. Reports improvement in burning chest pain after pepcid. Pt will return with any worsening or concerning symptoms.

## 2021-11-10 NOTE — ED PROVIDER NOTE - CLINICAL SUMMARY MEDICAL DECISION MAKING FREE TEXT BOX
Sandrita: Consider CAD (check trop, EKG, CXR). No discomfort now, so no need for Maalox/Pepcid. Re-assess.

## 2021-11-12 ENCOUNTER — APPOINTMENT (OUTPATIENT)
Dept: CARDIOLOGY | Facility: CLINIC | Age: 51
End: 2021-11-12
Payer: COMMERCIAL

## 2021-11-12 ENCOUNTER — NON-APPOINTMENT (OUTPATIENT)
Age: 51
End: 2021-11-12

## 2021-11-12 VITALS
HEIGHT: 65 IN | HEART RATE: 75 BPM | DIASTOLIC BLOOD PRESSURE: 80 MMHG | WEIGHT: 206 LBS | BODY MASS INDEX: 34.32 KG/M2 | OXYGEN SATURATION: 97 % | SYSTOLIC BLOOD PRESSURE: 121 MMHG

## 2021-11-12 DIAGNOSIS — R12 HEARTBURN: ICD-10-CM

## 2021-11-12 PROCEDURE — 93000 ELECTROCARDIOGRAM COMPLETE: CPT

## 2021-11-12 PROCEDURE — 99214 OFFICE O/P EST MOD 30 MIN: CPT

## 2021-11-12 NOTE — ASSESSMENT
[FreeTextEntry1] : Assessment:\par 1.  morbid obesity - improving\par 2.  s/p bariatric surgery\par 3/.  Normal LHC\par 4.  atypical chest pain - GERD?\par \par \par Plan\par 1. Doubt primary cardiac cause of symptoms - trop negative, recent LHC normal, and ecg unchanged and no more symptoms.\par 2.  Her symptoms improved with TUMS\par 3. Echocardiogram to assess for atypical chest pain (though anticipate a normal result)\par 4.  trial of omeprazole to take as needed for gerd.  also asked her to discuss with Dr. murillo if symptoms continue \par 5.  If all ok then return in 6 months.

## 2021-11-12 NOTE — PHYSICAL EXAM
[General Appearance - Well Developed] : well developed [Normal Appearance] : normal appearance [Well Groomed] : well groomed [General Appearance - Well Nourished] : well nourished [No Deformities] : no deformities [General Appearance - In No Acute Distress] : no acute distress [Normal Conjunctiva] : the conjunctiva exhibited no abnormalities [Eyelids - No Xanthelasma] : the eyelids demonstrated no xanthelasmas [Normal Oral Mucosa] : normal oral mucosa [No Oral Pallor] : no oral pallor [No Oral Cyanosis] : no oral cyanosis [Normal Jugular Venous A Waves Present] : normal jugular venous A waves present [Normal Jugular Venous V Waves Present] : normal jugular venous V waves present [No Jugular Venous Colón A Waves] : no jugular venous colón A waves [Respiration, Rhythm And Depth] : normal respiratory rhythm and effort [Exaggerated Use Of Accessory Muscles For Inspiration] : no accessory muscle use [Auscultation Breath Sounds / Voice Sounds] : lungs were clear to auscultation bilaterally [Heart Rate And Rhythm] : heart rate and rhythm were normal [Murmurs] : no murmurs present [Heart Sounds] : normal S1 and S2 [Abdomen Soft] : soft [Abdomen Tenderness] : non-tender [Abdomen Mass (___ Cm)] : no abdominal mass palpated [Abnormal Walk] : normal gait [Gait - Sufficient For Exercise Testing] : the gait was sufficient for exercise testing [Nail Clubbing] : no clubbing of the fingernails [Cyanosis, Localized] : no localized cyanosis [Petechial Hemorrhages (___cm)] : no petechial hemorrhages [Skin Color & Pigmentation] : normal skin color and pigmentation [] : no rash [No Venous Stasis] : no venous stasis [Skin Lesions] : no skin lesions [No Skin Ulcers] : no skin ulcer [No Xanthoma] : no  xanthoma was observed [Oriented To Time, Place, And Person] : oriented to person, place, and time [Affect] : the affect was normal [Mood] : the mood was normal [No Anxiety] : not feeling anxious

## 2021-11-12 NOTE — REASON FOR VISIT
[Follow-Up - Clinic] : a clinic follow-up of [FreeTextEntry2] : chest pain  [FreeTextEntry1] : 11/12/2021\par She had gastirc sleeve May with Dr. Melchor\par Feels well\par Lost 60 pounds\par She is still losing weight\par seeing nutritionist next Tuesday\par She went to ER 2 days ago.  She was having chest pain in the center of the chest.  She had the pain until yesterday.  No longer present.  Was told in ER that ECG, labs were normal.  \par \par ECG is unchanged today \par no dyspnea\par no leg swelling \par Vitals are stable\par \par Medications:  none, only vitamins\par \par \par  \par

## 2021-11-12 NOTE — REASON FOR VISIT
[FreeTextEntry1] : Patient seen in April 2021 (five months ago) by Dr. JOSE Parra, here in cardiology. \par \par Had LHC with Dr. Lerma, no sig coronary dz on April 1, 2021. \par Echo was normal (March 2021).\par \par

## 2021-11-13 ENCOUNTER — RESULT REVIEW (OUTPATIENT)
Age: 51
End: 2021-11-13

## 2021-12-17 ENCOUNTER — APPOINTMENT (OUTPATIENT)
Dept: SURGERY | Facility: CLINIC | Age: 51
End: 2021-12-17
Payer: COMMERCIAL

## 2021-12-17 DIAGNOSIS — E66.01 MORBID (SEVERE) OBESITY DUE TO EXCESS CALORIES: ICD-10-CM

## 2021-12-17 PROCEDURE — 99024 POSTOP FOLLOW-UP VISIT: CPT

## 2021-12-23 NOTE — DISCHARGE NOTE PROVIDER - NSDCMRMEDTOKEN_GEN_ALL_CORE_FT
Aspirin Enteric Coated 81 mg oral delayed release tablet: 1 tab(s) orally once a day, last dose 5/11/2021  Vitamin B-12 1000 mcg oral tablet: 1 tab(s) orally once a day  Vitamin D3 2000 intl units (50 mcg) oral tablet: 1 tab(s) orally once a day   Aspirin Enteric Coated 81 mg oral delayed release tablet: 1 tab(s) orally once a day, last dose 2021  hyoscyamine 0.125 mg sublingual tablet: 1 tab(s) sublingual every 6 hours as needed for gastric spasms   omeprazole 40 mg oral delayed release capsule: 1 cap(s) orally once a day    Open capsule into applesauce  ondansetron 4 mg oral tablet, disintegratin tab(s) orally 3 times a day  as needed for nausea  Tylenol Extra Strength 500 mg/15 mL oral liquid: 15 milliliter(s) orally every 6 hours as needed for pain MDD:60 mL  Vitamin B-12 1000 mcg oral tablet: 1 tab(s) orally once a day  Vitamin D3 2000 intl units (50 mcg) oral tablet: 1 tab(s) orally once a day   Headache Monitoring: I recommended monitoring the headaches for now. There is no evidence of increased intracranial pressure. They were instructed to call if the headaches are worsening.

## 2022-02-24 NOTE — PROVIDER CONTACT NOTE (OTHER) - RECOMMENDATIONS
hold off on starting clear diet Dupixent Counseling: I discussed with the patient the risks of dupilumab including but not limited to eye infection and irritation, cold sores, injection site reactions, worsening of asthma, allergic reactions and increased risk of parasitic infection.  Live vaccines should be avoided while taking dupilumab. Dupilumab will also interact with certain medications such as warfarin and cyclosporine. The patient understands that monitoring is required and they must alert us or the primary physician if symptoms of infection or other concerning signs are noted.

## 2022-03-02 ENCOUNTER — OUTPATIENT (OUTPATIENT)
Dept: OUTPATIENT SERVICES | Facility: HOSPITAL | Age: 52
LOS: 1 days | End: 2022-03-02
Payer: COMMERCIAL

## 2022-03-02 ENCOUNTER — APPOINTMENT (OUTPATIENT)
Dept: CV DIAGNOSITCS | Facility: HOSPITAL | Age: 52
End: 2022-03-02

## 2022-03-02 DIAGNOSIS — R12 HEARTBURN: ICD-10-CM

## 2022-03-02 DIAGNOSIS — Z98.891 HISTORY OF UTERINE SCAR FROM PREVIOUS SURGERY: Chronic | ICD-10-CM

## 2022-03-02 PROCEDURE — 93306 TTE W/DOPPLER COMPLETE: CPT

## 2022-03-02 PROCEDURE — 93306 TTE W/DOPPLER COMPLETE: CPT | Mod: 26

## 2022-04-04 NOTE — ASU PATIENT PROFILE, ADULT - PT NEEDS ASSIST
The patient noted   25  years   ago   the onset of   moderate and mild  crampy  bilateral lower quadrant  pain   that radiated to the   periumbilical   and that lasted   25  years  .  Pain usually starts   intermittently  .  It was triggered by   any food intake  and relieved with   fasting  .  Since then similar episodes have occurred    times   per    and lasted   .  The subsequent episodes were triggered by   and relieved by   .  The patient was previously treated with  none  . The current treatment includes   none no

## 2022-05-13 ENCOUNTER — APPOINTMENT (OUTPATIENT)
Dept: CARDIOLOGY | Facility: CLINIC | Age: 52
End: 2022-05-13
Payer: COMMERCIAL

## 2022-05-13 VITALS
HEIGHT: 65 IN | OXYGEN SATURATION: 100 % | HEART RATE: 65 BPM | DIASTOLIC BLOOD PRESSURE: 88 MMHG | BODY MASS INDEX: 34.99 KG/M2 | WEIGHT: 210 LBS | SYSTOLIC BLOOD PRESSURE: 135 MMHG

## 2022-05-13 DIAGNOSIS — K21.9 GASTRO-ESOPHAGEAL REFLUX DISEASE W/OUT ESOPHAGITIS: ICD-10-CM

## 2022-05-13 PROCEDURE — 99214 OFFICE O/P EST MOD 30 MIN: CPT

## 2022-05-13 PROCEDURE — 93000 ELECTROCARDIOGRAM COMPLETE: CPT

## 2022-05-13 NOTE — ASSESSMENT
[FreeTextEntry1] : Assessment:\par 1. Obesity s/p bariatric surgery\par 2. No notable CAD on LHC\par     Normal LVEF and no valvular disease\par 3. GERD\par \par Plan:\par 1. Currently asymptomatic.\par 2. Continue PPI PRN.\par 3. Check LDL, goal LDL less than 100.  She has mild athero on the aorta .  Consider statin therapy if not at goal\par 4. Educated on healthy diet and exercise.\par 5. Return in 1 year. Call if any questions.

## 2022-05-13 NOTE — PHYSICAL EXAM
[General Appearance - Well Developed] : well developed [Normal Appearance] : normal appearance [Well Groomed] : well groomed [General Appearance - Well Nourished] : well nourished [No Deformities] : no deformities [General Appearance - In No Acute Distress] : no acute distress [Normal Conjunctiva] : the conjunctiva exhibited no abnormalities [Eyelids - No Xanthelasma] : the eyelids demonstrated no xanthelasmas [Normal Oral Mucosa] : normal oral mucosa [No Oral Pallor] : no oral pallor [No Oral Cyanosis] : no oral cyanosis [Normal Jugular Venous A Waves Present] : normal jugular venous A waves present [Normal Jugular Venous V Waves Present] : normal jugular venous V waves present [No Jugular Venous Colón A Waves] : no jugular venous colón A waves [Respiration, Rhythm And Depth] : normal respiratory rhythm and effort [Exaggerated Use Of Accessory Muscles For Inspiration] : no accessory muscle use [Auscultation Breath Sounds / Voice Sounds] : lungs were clear to auscultation bilaterally [Heart Rate And Rhythm] : heart rate and rhythm were normal [Heart Sounds] : normal S1 and S2 [Murmurs] : no murmurs present [Abdomen Soft] : soft [Abdomen Tenderness] : non-tender [Abdomen Mass (___ Cm)] : no abdominal mass palpated [Abnormal Walk] : normal gait [Gait - Sufficient For Exercise Testing] : the gait was sufficient for exercise testing [Nail Clubbing] : no clubbing of the fingernails [Cyanosis, Localized] : no localized cyanosis [Petechial Hemorrhages (___cm)] : no petechial hemorrhages [Skin Color & Pigmentation] : normal skin color and pigmentation [] : no rash [No Venous Stasis] : no venous stasis [Skin Lesions] : no skin lesions [No Skin Ulcers] : no skin ulcer [No Xanthoma] : no  xanthoma was observed [Oriented To Time, Place, And Person] : oriented to person, place, and time [Affect] : the affect was normal [Mood] : the mood was normal [No Anxiety] : not feeling anxious [FreeTextEntry1] : No LE edema

## 2022-05-13 NOTE — REASON FOR VISIT
[Follow-Up - Clinic] : a clinic follow-up of [FreeTextEntry2] : routine cardiac care [FreeTextEntry1] : 5/13/2022\par \par No C/P.\par Reports intermittent GERD, relieved with Omeprazole.\par No LE pain or edema.\par No SOB. \par Weight stable since last visit.\par Reports normal recent labs. \par \par Medications:\par Omeprazole PRN\par \par 11/12/2021\par She had gastirc sleeve May with Dr. Melchor\par Feels well\par Lost 60 pounds\par She is still losing weight\par seeing nutritionist next Tuesday\par She went to ER 2 days ago.  She was having chest pain in the center of the chest.  She had the pain until yesterday.  No longer present.  Was told in ER that ECG, labs were normal.  \par \par ECG is unchanged today \par no dyspnea\par no leg swelling \par Vitals are stable\par \par Medications:  none, only vitamins

## 2022-05-16 DIAGNOSIS — E78.5 HYPERLIPIDEMIA, UNSPECIFIED: ICD-10-CM

## 2022-05-16 LAB
ALBUMIN SERPL ELPH-MCNC: 4 G/DL
ALP BLD-CCNC: 110 U/L
ALT SERPL-CCNC: 15 U/L
ANION GAP SERPL CALC-SCNC: 10 MMOL/L
AST SERPL-CCNC: 35 U/L
BILIRUB SERPL-MCNC: 0.2 MG/DL
BUN SERPL-MCNC: 12 MG/DL
CALCIUM SERPL-MCNC: 9.5 MG/DL
CHLORIDE SERPL-SCNC: 104 MMOL/L
CHOLEST SERPL-MCNC: 244 MG/DL
CO2 SERPL-SCNC: 25 MMOL/L
CREAT SERPL-MCNC: 0.62 MG/DL
EGFR: 107 ML/MIN/1.73M2
GLUCOSE SERPL-MCNC: 86 MG/DL
HDLC SERPL-MCNC: 78 MG/DL
LDLC SERPL CALC-MCNC: 147 MG/DL
NONHDLC SERPL-MCNC: 166 MG/DL
POTASSIUM SERPL-SCNC: 5.8 MMOL/L
PROT SERPL-MCNC: 7 G/DL
SODIUM SERPL-SCNC: 139 MMOL/L
TRIGL SERPL-MCNC: 96 MG/DL

## 2022-05-16 RX ORDER — OMEPRAZOLE 20 MG/1
20 CAPSULE, DELAYED RELEASE ORAL
Qty: 30 | Refills: 1 | Status: ACTIVE | COMMUNITY
Start: 2021-11-12 | End: 1900-01-01

## 2022-05-16 RX ORDER — ROSUVASTATIN CALCIUM 5 MG/1
5 TABLET, FILM COATED ORAL
Qty: 30 | Refills: 2 | Status: ACTIVE | COMMUNITY
Start: 2022-05-16 | End: 1900-01-01

## 2022-05-20 LAB
ALBUMIN SERPL ELPH-MCNC: 4.2 G/DL
ALP BLD-CCNC: 109 U/L
ALT SERPL-CCNC: 18 U/L
ANION GAP SERPL CALC-SCNC: 12 MMOL/L
AST SERPL-CCNC: 22 U/L
BILIRUB SERPL-MCNC: 0.3 MG/DL
BUN SERPL-MCNC: 13 MG/DL
CALCIUM SERPL-MCNC: 9.3 MG/DL
CHLORIDE SERPL-SCNC: 102 MMOL/L
CO2 SERPL-SCNC: 27 MMOL/L
CREAT SERPL-MCNC: 0.65 MG/DL
EGFR: 106 ML/MIN/1.73M2
GLUCOSE SERPL-MCNC: 82 MG/DL
POTASSIUM SERPL-SCNC: 4.3 MMOL/L
PROT SERPL-MCNC: 7 G/DL
SODIUM SERPL-SCNC: 140 MMOL/L

## 2022-09-16 ENCOUNTER — APPOINTMENT (OUTPATIENT)
Dept: CARDIOLOGY | Facility: CLINIC | Age: 52
End: 2022-09-16

## 2023-05-03 NOTE — ED PROVIDER NOTE - INTERPRETATION
normal sinus rhythm Benzoyl Peroxide Pregnancy And Lactation Text: This medication is Pregnancy Category C. It is unknown if benzoyl peroxide is excreted in breast milk.

## 2023-06-15 NOTE — DISCHARGE NOTE PROVIDER - NSDCCPGOAL_GEN_ALL_CORE_FT
Quality 226: Preventive Care And Screening: Tobacco Use: Screening And Cessation Intervention: Patient screened for tobacco use and is an ex/non-smoker Quality 130: Documentation Of Current Medications In The Medical Record: Current Medications Documented Detail Level: Detailed Quality 431: Preventive Care And Screening: Unhealthy Alcohol Use - Screening: Patient screened for unhealthy alcohol use using a single question and scores less than 2 times per year Quality 47: Advance Care Plan: Advance care planning not documented, reason not otherwise specified. To get better and follow your care plan as instructed.

## 2023-08-03 NOTE — H&P CARDIOLOGY - RESPIRATORY RATE (BREATHS/MIN)
Interventional Radiology Progress Note    Patient: Pebbles Dubon Date: 8/3/2023  : 1946 Attending: Lisbet Gan CNP Gist, Darnella, MD   77 year old female     Reason for Follow -up : S/p IR mesenteric angiogram 23    Chief Complaint: Radiation exposure    History: Patient is a pleasant 78 y/o female that underwent IR mesenteric angiogram for bleeding 23 during hospitalization. She is returning to IR for skin evaluation for radiation exposure of 5118mGy. She denies redness, ulceration, wound, burning pain of any area of skin in abdomen or back. She is doing well from the procedure without any further bleeding, no groin pain, swelling or drainage.     Subjective:   6 point review of systems negative except as noted in HPI     Physical Exam:  Visit Vitals  BP (!) 158/74 (BP Location: LUE - Left upper extremity, Patient Position: Sitting/High-Douglas's) Comment: Migue Jimenez notified   Pulse 61   Resp 18   Ht 5' 6\" (1.676 m)   Wt 85.7 kg (189 lb)   SpO2 100%   BMI 30.51 kg/m²     General appearance: alert, appears stated age and no distress  Back: Back symmetric, no curvature. Range of motion normal. No costovertebral angle tenderness, skin warm, dry and intact, no lesions.   Abdomen: Soft, non-tender; bowel sounds normal; no masses, no hepatosplenomegaly and skin warm, dry, intact, no lesions noted.   Extremities: LE warm and well perfused. No edema. R groin puncture site CD+I without oozing, bleeding, pain or hematoma    Vital Last Value 24 Hour Range   Temperature   No data recorded   Pulse 61 (23) Pulse  Min: 61  Max: 61   Respiratory 18 (23) Resp  Min: 18  Max: 18   Non-Invasive  Blood Pressure (!) 158/74 (Migue Jimenez notified) (23) BP  Min: 158/74  Max: 158/74   Arterial  Blood Pressure   No data recorded   Pulse Oximetry 100 % (23) SpO2  Min: 100 %  Max: 100 %       Lab Results   Component Value Date    SODIUM 143 2023     POTASSIUM 4.0 07/19/2023    BUN 7 07/19/2023    CREATININE 0.60 07/19/2023    WBC 6.4 07/19/2023    HCT 24.9 (L) 07/19/2023    HGB 8.2 (L) 07/19/2023     07/19/2023    INR 1.3 07/14/2023    GLUCOSE 221 (H) 07/19/2023    TSH 1.376 05/28/2020    MG 1.4 (L) 07/18/2023    BILIRUBIN 0.2 07/18/2023       Laboratory Results: Above labs last 24 hours were reviewed    Scheduled Medications: Reviewed in EPIC 8/3/2023  No current facility-administered medications for this encounter.     Imaging:  IR SUPERIOR MESENTERIC ANGIOGRAM    Result Date: 7/16/2023  Narrative: EXAM:IR SUPERIOR MESENTERIC ANGIOGRAM CLINICAL INFORMATION:acute GI bleed in patient with history of diverticulosis/diverticulitis. COMPARISON:14 July 2023. FINDINGS: Called emergently for patient who had presented 15 hours ago to emergency department with bright red blood per rectum. GI consulted at that time. Patient reportedly had multiple bloody bowel movements through the day requiring transfusion and eventually became symptomatic. CT angiography/the standard bleeding study performed proximally 12 hours after presentation and demonstrates mid ascending colonic bleed. GI deferred evaluation and interventional radiology was called to emergently evaluate and treat patient. After discussing angiography, potential risk of complication, benefits, and alternatives with the patient, both signed and verbal consent were obtained. This procedure is performed immediately subsequent to a right arm PICC placement which is reported separately. The right groin was cleaned and draped. Preprocedure ultrasound confirms wide patency of the right common femoral artery. An ultrasound was stored to document this finding and approach. The patient received intravenous Versed and fentanyl for conscious sedation during this procedure. Approximately 40 minutes of monitored conscious sedation were administered during the course of this study according to standard departmental  18 protocols, including continuous monitoring by a trained sedation nurse, overseen by the attending Interventional Radiologist. Soft tissues overlying the right common femoral artery were locally anesthetized with 1% lidocaine solution as well and retrograde access was achieved into the right common femoral artery using a micropuncture access system and standard Seldinger technique. The access needle was exchanged over 0.018 wire for the graduated introducer which was then exchanged over a [employer] wire for a 5 [place] sheath which was left to intermittent heparinized saline flush. A C2 catheter was advanced over the wire and used to engage the orifice of the superior mesenteric artery. Selected arteriography was performed from this location. No site of extravasation is identified. A coaxial renegade microcatheter was advanced through the C2 catheter and subselected right and middle colic angiography was performed. Subselected arcade branches were then cannulated and at least 6 separate subselected sites of angiography were performed throughout the right and middle colic distributions. No site of acute extravasation is identified. The left colic artery was also selected and evaluated. After a delay, repeat right colic angiography was performed and again does not demonstrate site of active extravasation. Specific attention was given to the mid ascending level site identified at angiography is the location of extravasation. No neovascularity or other definite abnormality is identified. A broad blush is noted in the mid ascending level on the lateral aspect of the colon along a 3 cm length which could represent an area of angiodysplasia, yet this is not definitive, no extravasation is associated with it, and it represents to broad of a distribution for prophylactic regional embolization. The patient remained hemodynamically stable throughout this procedure. No further bloody bowel movements are present. Further evaluation  is deferred. The catheter/microcatheter were removed. Right iliofemoral angiography was performed confirming right common femoral approach access without significant stenosis. The sheath was removed and the access site was secured utilizing a Mynx closure device and manual compression. The patient tolerated the procedure well and was returned to her room in stable condition. Post procedure, the ordering hospitalist was contacted. These results were relayed. Recommendation was made for bowel preparation and endoscopy in follow-up.     Impression: Impression: 1. Superior mesenteric angiography performed including selected superior mesenteric, subselected right middle and left colic, and at least 6 separate third-order subselected distributions were evaluated. 2. There is no evidence of site of active extravasation or definitively targetable abnormality. No embolization was performed. 3. Patient remained clinically stable throughout our procedure. 4. Access site secured after selected right iliofemoral angiography with minx closure device. Electronically Signed by: Sebas Helms MD Signed on: 7/16/2023 11:04 AM Workstation ID: [id number]    Assessment/Plan  S/p mesenteric angiogram with radiation exposure of >5000mGy. There are no sings of skin injury on exam, no groin complications, she has had no further bleeding.     -Continue monitoring for skin redness, pain, ulcer   -Call IR with development of any of those symptoms.   -Present to ED if bleeding develops again    Elia Raygoza PA-C  Vascular and Interventional Radiology  Discussed with Dr. Simmons   [phone] (M-F 7am-11pm, Sat-Sun 7a-8pm)

## 2023-12-29 NOTE — ED PROVIDER NOTE - PRIOR EKG STATUS
PRIMARY DIAGNOSIS: GENERALIZED WEAKNESS    OUTPATIENT/OBSERVATION GOALS TO BE MET BEFORE DISCHARGE  1. Orthostatic performed: N/A    2. Tolerating PO medications: Yes    3. Return to near baseline physical activity: Yes    4. Cleared for discharge by consultants (if involved): No    Discharge Planner Nurse   Safe discharge environment identified: Yes  Barriers to discharge: Yes       Entered by: Radha Beyer RN 12/29/2023 1:36 PM     Please review provider order for any additional goals.   Nurse to notify provider when observation goals have been met and patient is ready for discharge.     there is no prior EKG available for comparison

## 2024-01-03 ENCOUNTER — NON-APPOINTMENT (OUTPATIENT)
Age: 54
End: 2024-01-03

## 2024-07-29 NOTE — ASU PATIENT PROFILE, ADULT - NSTOBACCONEVERSMOKERY/N_GEN_A
Refill Routing Note   Medication(s) are not appropriate for processing by [facility] for the following reason(s):        Outside of protocol    [facility] action(s):  Route               Appointments  past 12m or future 3m with PCP    Date Provider   Last Visit   3/27/2024 Hien Sparks MD   Next Visit   9/26/2024 Hien Sparks MD   ED visits in past 90 days: 0        Note composed:4:34 PM 07/29/2024           No